# Patient Record
Sex: FEMALE | Race: WHITE | NOT HISPANIC OR LATINO | Employment: OTHER
[De-identification: names, ages, dates, MRNs, and addresses within clinical notes are randomized per-mention and may not be internally consistent; named-entity substitution may affect disease eponyms.]

---

## 2017-02-09 ENCOUNTER — HOSPITAL (OUTPATIENT)
Dept: OTHER | Age: 72
End: 2017-02-09
Attending: INTERNAL MEDICINE

## 2018-03-12 ENCOUNTER — HOSPITAL (OUTPATIENT)
Dept: OTHER | Age: 73
End: 2018-03-12
Attending: INTERNAL MEDICINE

## 2018-03-22 ENCOUNTER — HOSPITAL (OUTPATIENT)
Dept: OTHER | Age: 73
End: 2018-03-22
Attending: INTERNAL MEDICINE

## 2018-04-01 ENCOUNTER — HOSPITAL (OUTPATIENT)
Dept: OTHER | Age: 73
End: 2018-04-01
Attending: FAMILY MEDICINE

## 2018-04-07 ENCOUNTER — HOSPITAL (OUTPATIENT)
Dept: OTHER | Age: 73
End: 2018-04-07
Attending: INTERNAL MEDICINE

## 2018-04-19 PROBLEM — 442076002 EARLY SATIETY: Status: ACTIVE | Noted: 2018-04-19

## 2018-04-19 PROBLEM — 29857009 CHEST PAIN: Status: ACTIVE | Noted: 2018-04-19

## 2018-05-23 PROBLEM — 28132005 SPASM OF SPHINCTER OF ODDI: Status: ACTIVE | Noted: 2018-05-23

## 2018-05-23 PROBLEM — 274527008 ABNORMAL FINDINGS DIAGNOSTIC IMAGING OF LIVER AND BILIARY TRACT: Status: ACTIVE | Noted: 2018-04-19

## 2018-08-31 ENCOUNTER — HOSPITAL (OUTPATIENT)
Dept: OTHER | Age: 73
End: 2018-08-31
Attending: INTERNAL MEDICINE

## 2018-09-06 ENCOUNTER — HOSPITAL (OUTPATIENT)
Dept: OTHER | Age: 73
End: 2018-09-06
Attending: INTERNAL MEDICINE

## 2018-09-17 ENCOUNTER — HOSPITAL (OUTPATIENT)
Dept: OTHER | Age: 73
End: 2018-09-17
Attending: INTERNAL MEDICINE

## 2018-11-15 ENCOUNTER — HOSPITAL (OUTPATIENT)
Dept: OTHER | Age: 73
End: 2018-11-15
Attending: INTERNAL MEDICINE

## 2018-11-15 ENCOUNTER — IMAGING SERVICES (OUTPATIENT)
Dept: OTHER | Age: 73
End: 2018-11-15

## 2019-10-24 PROBLEM — 79922009 EPIGASTRIC PAIN: Status: ACTIVE | Noted: 2018-04-19

## 2019-10-24 PROBLEM — 267103008: Status: ACTIVE | Noted: 2019-10-24

## 2020-03-03 ENCOUNTER — APPOINTMENT (OUTPATIENT)
Dept: MAMMOGRAPHY | Age: 75
End: 2020-03-03
Attending: INTERNAL MEDICINE

## 2020-03-20 ENCOUNTER — APPOINTMENT (OUTPATIENT)
Dept: MAMMOGRAPHY | Age: 75
End: 2020-03-20
Attending: INTERNAL MEDICINE

## 2020-06-06 ENCOUNTER — TELEPHONE ENCOUNTER (OUTPATIENT)
Dept: URBAN - METROPOLITAN AREA CLINIC 35 | Facility: CLINIC | Age: 75
End: 2020-06-06

## 2020-06-10 ENCOUNTER — OFFICE VISIT (OUTPATIENT)
Dept: URBAN - METROPOLITAN AREA CLINIC 35 | Facility: CLINIC | Age: 75
End: 2020-06-10

## 2020-06-10 ENCOUNTER — TELEPHONE ENCOUNTER (OUTPATIENT)
Dept: URBAN - METROPOLITAN AREA CLINIC 35 | Facility: CLINIC | Age: 75
End: 2020-06-10

## 2020-06-10 RX ORDER — ZOLPIDEM TARTRATE 5 MG/1
1 TABLET AT BEDTIME TABLET ORAL ONCE A DAY
Status: ACTIVE | COMMUNITY

## 2020-06-10 RX ORDER — METOCLOPRAMIDE 5 MG/1
TABLET ORAL
Qty: 60 UNSPECIFIED | Status: ON HOLD | COMMUNITY

## 2020-06-10 RX ORDER — FAMOTIDINE 20 MG/1
TABLET ORAL
Qty: 60 UNSPECIFIED | Status: ON HOLD | COMMUNITY

## 2020-06-10 RX ORDER — MAGNESIUM OXIDE/MAG AA CHELATE 300 MG
1 CAPSULE WITH A MEAL CAPSULE ORAL ONCE A DAY
Status: ACTIVE | COMMUNITY

## 2020-06-10 RX ORDER — FAMOTIDINE 20 MG/1
1 TABLET AT BEDTIME AS NEEDED TABLET, FILM COATED ORAL ONCE A DAY
Status: ACTIVE | COMMUNITY
Start: 2019-10-24

## 2020-06-10 RX ORDER — B-COMPLEX WITH VITAMIN C
TABLET ORAL
Status: ACTIVE | COMMUNITY

## 2020-06-10 NOTE — HPI-MIGRATED HPI
;   ;   ;   ;   ;   ;     Globus : Patient admits/denies epsiodes since last visit.  ?Continue Famotidine Tablet, 20 MG, 1 tablet at bedtime as needed, Orally, Once a day   ?Consult with Dr Robin   Last visit (01/23/2020) Patient denies a consult with Dr Robin. She admits occasional globus sensation . She has a post - nasal drip        Last visit (10/24/2019) Admit globus sensation.;   Early Satiety : Patient admits/denies episodes since last visit.  ? Dietary modifications - Gastric emptying study discussed.           Last visit (01/23/2020) Patient denies experiencing early satiety at this time.   Last visit (10/24/2019) She denies that she experience early satiety. She admits that she eats smaller meals and she chews slower.   Last visit (7/18/2019) Patient denies recent episodes of early satiety.  Last visit (3/27/2019)  Patient denies recent episodes of early satiety.  Last visit (5/23/2018) Patient denies any recent episodes of early satiety since her last office visit. She attributes improvement to eating smaller meals, completely chewing food prior to swallowing and following the low FODMAP diet.   At last visit (4/19/2018) Admit early satiety with onset 5 years ago. Eating smaller meals help, but she report still feeling hungry and she will eat more. ;   Chest pain : Patient admits/denies epsiodes since last visit.  Last visit (01/23/2020) She denies any chest pain at this time.   Last visit (10/24/2019) She denies any chest pains at this time.   Last visit (7/18/2019) Patient denies recent episodes of chest pain.   Last visit (3/27/2019) Patient denies recent episodes of chest pain.  Last visit (5/23/2018) Patient denies any recent episodes of chest pain.   At last visit (4/19/2018) Admit a pinching sensation in mid chest that occured three days ago. Lasting for a few seconds.;   Belching : Patient adits/denies epsiodes since last visit.  Last visit (01/23/2020) She admits occasional belching. She reports that belching has decreased since her last office visit.    Last visit (10/24/2019) She admits belching episodes have resolved at this time.   Last visit (7/18/2019) Patient admits improvement of eructation. Patient admits following the low FODMAP diet     Last visit (3/27/2019)  Patient marks improvement of eructation.   Last visit (5/23/2018) Patient admits improvement of symptoms since starting the low FODMAP diet.  At last visit (4/19/2018) Admit frequent belching over the past 3-4 months.  Symptoms occur throughout the day.  Eating smaller meals help. She has been taking Metoclopramide 5 mg prn, and Famotidine 20 mg as needed since her EGD a little over 3 years ago  She report having an EGD a little over 3 years ago ?(2014) performed by Dr. Jeanette Parekh with findings of GERD and H. pylori. Patient report she was treated with Metoclopramide 5 mg prn, and Famotidine 20 mg as needed. ;   Change in Bowel habits : Patient admits/ denies episodes of change of bowel habits since last office visit.   Patient currently admits __ bowel movements per __.  Stools are ___.  Patient ___ blood, mucus, or melena in stools.   Last visit (01/23/2020) Patient admits that her bowel habits are back to normal. She reports that she will have 3 bowel movements a day with no strain. She denies any rectal bleeding or rectal pain, or pruritus ani at this time.   Last visit (10/24/2019) Patient admits that her bowel habits have returned to normal. She admits 1-2 bowel movments a day with stools normal in form.    She denies following the Low FODMAP diet at this time. She admits that she did try the diet and she is now feeling better.   Get colonoscopy records from Dr BRYAN Garcia [facility].        Last visit (7/18/2019) Patient admits increase in bowel movements for the past 3 weeks. Patient admits to 3-4 bowel movements a day, with formed stool. Patient previous movements were once a day. ;   Epigastric Pain : Patient presents today for a follow up of epigastric pain.  Patient admits/denies epsodes since last visit.  Patient continue Famotidine 20 MG, 1 QHS  ?Consult with Dr Robin   Last visit (01/23/2020) Patient presents today for a follow up of epigastric pain.  Patient admits occasional epigastric pain only when she eats spicy food.   Maddison admits that she didn't know that she was supposed to take this medication every night Famotidine 20 MG, 1 QHS. She states that she was taking it only once a week.   Patient denies a consult with Dr Robin.         Last visit (10/24/2019) Patient presents today for a follow up of epigastric pain. Patient admits that she has cut back on rice and french fries and she admits that her epigastric pain has subsided.   She has cut out dairy   Patient admits continued use of Ranitidine 150 mg prn.    Last visit (7/18/2019) Patient presents today for a follow up of epigastric pain, and to review ultrasound. Patient admits improvement of symptoms. Patient admits taking Ranitidine as needed.  Last visit (3/27/2019) She admit improvement of symptoms since her last office visit. She attributes this to following the low FODMAP diet and discontinuing spicy foods and Metoclopramide as she was advised to do at her previous office visit. Patient denies taking Ranitidine.  Last visit (5/23/2018) Patient presents today for a follow up of epigastric pain and to review recent MRI/MRCP results. Patient was also advised to complete a urea breath test at her last appointment, however results were Indeterminate.          Patient states that her symptoms have improved. She admits to having 1-2 episodes per week. She describes episodes as a discomfort occurring in her epigastric area. She does admit symptoms are worse when she is driving. She denies any aggravating factors.         Since her last visit she has discontinued Metoclopramide 5 mg prn, and Famotidine 20 mg as she was advised to do at her last office visit on 04/19/2018. In addition, patient admits to following the low FODMAP since her last visit.          At last visit (4/19/2018) Patient presents today at the request of Dr. Holm for a consultation of epigastric pain. Onset 5 years ago. Report 2-3 episodes occurred 3-4 months ago without provocation. Described as a pain, unsure how to describe. She has been taking Metoclopramide 5 mg prn, and Famotidine 20 mg as needed since her EGD a little over 3 years ago with relief of digestion.           Outside Labs:PCP (3/23/2018) CMP: (WNL) ALT 19, AST 22, Alk. Phosphatase 50, Albumin 4.6. CBC: Hgb 14.7, HCT 44.3. ;

## 2020-06-30 ENCOUNTER — TELEPHONE ENCOUNTER (OUTPATIENT)
Dept: URBAN - METROPOLITAN AREA CLINIC 35 | Facility: CLINIC | Age: 75
End: 2020-06-30

## 2020-07-01 ENCOUNTER — OFFICE VISIT (OUTPATIENT)
Dept: URBAN - METROPOLITAN AREA CLINIC 35 | Facility: CLINIC | Age: 75
End: 2020-07-01

## 2020-07-01 VITALS
DIASTOLIC BLOOD PRESSURE: 58 MMHG | HEIGHT: 60 IN | WEIGHT: 104 LBS | OXYGEN SATURATION: 97 % | HEART RATE: 74 BPM | SYSTOLIC BLOOD PRESSURE: 110 MMHG | TEMPERATURE: 98.7 F | BODY MASS INDEX: 20.42 KG/M2

## 2020-07-01 PROBLEM — 197433003: Status: ACTIVE | Noted: 2018-07-11

## 2020-07-01 RX ORDER — METOCLOPRAMIDE 5 MG/1
TABLET ORAL
Qty: 60 UNSPECIFIED | Status: ON HOLD | COMMUNITY

## 2020-07-01 RX ORDER — ZOLPIDEM TARTRATE 5 MG/1
1 TABLET AT BEDTIME TABLET ORAL ONCE A DAY
Status: ACTIVE | COMMUNITY

## 2020-07-01 RX ORDER — MAGNESIUM OXIDE/MAG AA CHELATE 300 MG
1 CAPSULE WITH A MEAL CAPSULE ORAL ONCE A DAY
Status: ACTIVE | COMMUNITY

## 2020-07-01 RX ORDER — B-COMPLEX WITH VITAMIN C
TABLET ORAL
Status: ACTIVE | COMMUNITY

## 2020-07-01 RX ORDER — FAMOTIDINE 20 MG/1
TABLET ORAL
Qty: 60 UNSPECIFIED | Status: ON HOLD | COMMUNITY

## 2020-07-01 RX ORDER — FAMOTIDINE 20 MG/1
1 TABLET AT BEDTIME AS NEEDED TABLET, FILM COATED ORAL ONCE A DAY
Status: ACTIVE | COMMUNITY
Start: 2019-10-24

## 2020-07-01 NOTE — HPI-MIGRATED HPI
;   ;   ;   ;   ;   ;     Globus : Patient denies epsiodes since last visit.  Patient admits the continued Famotidine Tablet, 20 MG, at bedtime.   Last visit (01/23/2020) Patient denies a consult with Dr Robin. She admits occasional globus sensation . She has a post - nasal drip        Last visit (10/24/2019) Admit globus sensation.;   Early Satiety : Patient admits episodes since last visit.  She admits that she has made Dietary modifications as well as eat slower and chewing more has helped.   Last visit (01/23/2020) Patient denies experiencing early satiety at this time.   Last visit (10/24/2019) She denies that she experience early satiety. She admits that she eats smaller meals and she chews slower.   Last visit (7/18/2019) Patient denies recent episodes of early satiety.  Last visit (3/27/2019)  Patient denies recent episodes of early satiety.  Last visit (5/23/2018) Patient denies any recent episodes of early satiety since her last office visit. She attributes improvement to eating smaller meals, completely chewing food prior to swallowing and following the low FODMAP diet.   At last visit (4/19/2018) Admit early satiety with onset 5 years ago. Eating smaller meals help, but she report still feeling hungry and she will eat more. ;   Chest pain : Patient admits [facility] epsiodes 1-2 times a month since last visit.  Last visit (01/23/2020) She denies any chest pain at this time.   Last visit (10/24/2019) She denies any chest pains at this time.   Last visit (7/18/2019) Patient denies recent episodes of chest pain.   Last visit (3/27/2019) Patient denies recent episodes of chest pain.  Last visit (5/23/2018) Patient denies any recent episodes of chest pain.   At last visit (4/19/2018) Admit a pinching sensation in mid chest that occured three days ago. Lasting for a few seconds.;   Belching : Patient admits occasional belching but reports that symptoms have decreased.   Last visit (01/23/2020) She admits occasional belching. She reports that belching has decreased since her last office visit.    Last visit (10/24/2019) She admits belching episodes have resolved at this time.   Last visit (7/18/2019) Patient admits improvement of eructation. Patient admits following the low FODMAP diet     Last visit (3/27/2019)  Patient marks improvement of eructation.   Last visit (5/23/2018) Patient admits improvement of symptoms since starting the low FODMAP diet.  At last visit (4/19/2018) Admit frequent belching over the past 3-4 months.  Symptoms occur throughout the day.  Eating smaller meals help. She has been taking Metoclopramide 5 mg prn, and Famotidine 20 mg as needed since her EGD a little over 3 years ago  She report having an EGD a little over 3 years ago ?(2014) performed by Dr. Jeanette Parekh with findings of GERD and H. pylori. Patient report she was treated with Metoclopramide 5 mg prn, and Famotidine 20 mg as needed. ;   Change in Bowel habits : Patient denies that her bowel habits have returned to normal.   Patient currently admits 2-3 bowel movements per day with no strain.  Stools are soft.  Patient denies blood, mucus, or melena in stools.   Last visit (01/23/2020) Patient admits that her bowel habits are back to normal. She reports that she will have 3 bowel movements a day with no strain. She denies any rectal bleeding or rectal pain, or pruritus ani at this time.   Last visit (10/24/2019) Patient admits that her bowel habits have returned to normal. She admits 1-2 bowel movments a day with stools normal in form.    She denies following the Low FODMAP diet at this time. She admits that she did try the diet and she is now feeling better.   Get colonoscopy records from Dr BRYAN Parekh - GI.        Last visit (7/18/2019) Patient admits increase in bowel movements for the past 3 weeks. Patient admits to 3-4 bowel movements a day, with formed stool. Patient previous movements were once a day. ;   Epigastric Pain : Patient presents today for a follow up of epigastric pain.  Patient admits continued epsodes of epigastric pain since last visit. She admits the pain is not as frequent. She will experience this pain 1-2 times a month.   She admits the use of Famotidine 20 MG at bedtime with mild relief of her symptoms. She denies that this controls her symptoms. She admits that she began using a homemade remedy that has helped reduce her symptoms.   She has food intolerance with sea food causing the epigastric pain   She admits that she has seen Dr Robin and her next visit with him will be in Oct/Nov.    Last visit (01/23/2020) Patient presents today for a follow up of epigastric pain.  Patient admits occasional epigastric pain only when she eats spicy food.   Mariet admits that she didn't know that she was supposed to take this medication every night Famotidine 20 MG, 1 QHS. She states that she was taking it only once a week.   Patient denies a consult with Dr Robin.         Last visit (10/24/2019) Patient presents today for a follow up of epigastric pain. Patient admits that she has cut back on rice and french fries and she admits that her epigastric pain has subsided.   She has cut out dairy   Patient admits continued use of Ranitidine 150 mg prn.    Last visit (7/18/2019) Patient presents today for a follow up of epigastric pain, and to review ultrasound. Patient admits improvement of symptoms. Patient admits taking Ranitidine as needed.  Last visit (3/27/2019) She admit improvement of symptoms since her last office visit. She attributes this to following the low FODMAP diet and discontinuing spicy foods and Metoclopramide as she was advised to do at her previous office visit. Patient denies taking Ranitidine.  Last visit (5/23/2018) Patient presents today for a follow up of epigastric pain and to review recent MRI/MRCP results. Patient was also advised to complete a urea breath test at her last appointment, however results were Indeterminate.          Patient states that her symptoms have improved. She admits to having 1-2 episodes per week. She describes episodes as a discomfort occurring in her epigastric area. She does admit symptoms are worse when she is driving. She denies any aggravating factors.         Since her last visit she has discontinued Metoclopramide 5 mg prn, and Famotidine 20 mg as she was advised to do at her last office visit on 04/19/2018. In addition, patient admits to following the low FODMAP since her last visit.          At last visit (4/19/2018) Patient presents today at the request of Dr. Holm for a consultation of epigastric pain. Onset 5 years ago. Report 2-3 episodes occurred 3-4 months ago without provocation. Described as a pain, unsure how to describe. She has been taking Metoclopramide 5 mg prn, and Famotidine 20 mg as needed since her EGD a little over 3 years ago with relief of digestion.           Outside Labs:PCP (3/23/2018) CMP: (WNL) ALT 19, AST 22, Alk. Phosphatase 50, Albumin 4.6. CBC: Hgb 14.7, HCT 44.3. ;

## 2020-07-04 ENCOUNTER — LAB OUTSIDE AN ENCOUNTER (OUTPATIENT)
Dept: URBAN - METROPOLITAN AREA CLINIC 35 | Facility: CLINIC | Age: 75
End: 2020-07-04

## 2020-07-08 LAB
GASTROINTESTINAL PATHOGEN: (no result)
H. PYLORI (EIA) - QDX: NEGATIVE

## 2020-07-13 LAB
IMMUNOGLOBULIN A: 177
INTERPRETATION: (no result)
TISSUE TRANSGLUTAMINASE$AB, IGA: 1

## 2020-07-30 ENCOUNTER — OFFICE VISIT (OUTPATIENT)
Dept: URBAN - METROPOLITAN AREA CLINIC 33 | Facility: CLINIC | Age: 75
End: 2020-07-30

## 2020-07-30 VITALS — BODY MASS INDEX: 21.6 KG/M2 | HEIGHT: 60 IN | TEMPERATURE: 97.1 F | WEIGHT: 110 LBS

## 2020-07-30 RX ORDER — FAMOTIDINE 20 MG/1
TABLET ORAL
Qty: 60 UNSPECIFIED | Status: ON HOLD | COMMUNITY

## 2020-07-30 RX ORDER — MAGNESIUM OXIDE/MAG AA CHELATE 300 MG
1 CAPSULE WITH A MEAL CAPSULE ORAL ONCE A DAY
Status: ACTIVE | COMMUNITY

## 2020-07-30 RX ORDER — METOCLOPRAMIDE 5 MG/1
TABLET ORAL
Qty: 60 UNSPECIFIED | Status: ON HOLD | COMMUNITY

## 2020-07-30 RX ORDER — ZOLPIDEM TARTRATE 5 MG/1
1 TABLET AT BEDTIME TABLET ORAL ONCE A DAY
Status: ACTIVE | COMMUNITY

## 2020-07-30 RX ORDER — FAMOTIDINE 20 MG/1
1 TABLET AT BEDTIME AS NEEDED TABLET, FILM COATED ORAL ONCE A DAY
Status: ACTIVE | COMMUNITY
Start: 2019-10-24

## 2020-07-30 RX ORDER — B-COMPLEX WITH VITAMIN C
TABLET ORAL
Status: ACTIVE | COMMUNITY

## 2020-07-30 RX ORDER — SODIUM PICOSULFATE, MAGNESIUM OXIDE, AND ANHYDROUS CITRIC ACID 10; 3.5; 12 MG/160ML; G/160ML; G/160ML
AS DIRECTED LIQUID ORAL
Qty: 1 KIT | OUTPATIENT
Start: 2020-07-30

## 2020-07-30 RX ORDER — IPRATROPIUM BROMIDE 21 UG/1
SPRAY, METERED NASAL
Qty: 30 UNSPECIFIED | Status: ACTIVE | COMMUNITY

## 2020-07-30 NOTE — HPI-MIGRATED HPI
;   ;   ;   ;   ;   ;     Globus : Admit always feel as if mucus is in her esophagus.  She saw ENT  Dr Robin and was treated with Ipratropium bromide NS with relief in the past, but has not been able to get from [facility] pharmacy due to not available.   Last visit (7/1/2020)Patient denies episodes since last visit.  Patient admits the continued Famotidine Tablet, 20 MG, at bedtime.   Last visit (01/23/2020) Patient denies a consult with Dr Robin. She admits occasional globus sensation . She has a post - nasal drip        Last visit (10/24/2019) Admit globus sensation.;   Early Satiety : Admit some relief with consuming smaller portion meals.    Last visit (7/1/2020) Patient admits episodes since last visit.  She admits that she has made Dietary modifications as well as eat slower and chewing more has helped.   Last visit (01/23/2020) Patient denies experiencing early satiety at this time.   Last visit (10/24/2019) She denies that she experience early satiety. She admits that she eats smaller meals and she chews slower.   Last visit (7/18/2019) Patient denies recent episodes of early satiety.  Last visit (3/27/2019)  Patient denies recent episodes of early satiety.  Last visit (5/23/2018) Patient denies any recent episodes of early satiety since her last office visit. She attributes improvement to eating smaller meals, completely chewing food prior to swallowing and following the low FODMAP diet.   At last visit (4/19/2018) Admit early satiety with onset 5 years ago. Eating smaller meals help, but she report still feeling hungry and she will eat more. ;   Chest pain : Admit improvement in symptoms wince last visit.     Last visit (7/1/2020) Patient admits NCCP episodes 1-2 times a month since last visit.  Last visit (01/23/2020) She denies any chest pain at this time.   Last visit (10/24/2019) She denies any chest pains at this time.   Last visit (7/18/2019) Patient denies recent episodes of chest pain.   Last visit (3/27/2019) Patient denies recent episodes of chest pain.  Last visit (5/23/2018) Patient denies any recent episodes of chest pain.   At last visit (4/19/2018) Admit a pinching sensation in mid chest that occurred three days ago. Lasting for a few seconds.;   Belching : Admit imoirvement since last visit,   Last visit (7/1/2020)Patient admits occasional belching but reports that symptoms have decreased.   Last visit (01/23/2020) She admits occasional belching. She reports that belching has decreased since her last office visit.    Last visit (10/24/2019) She admits belching episodes have resolved at this time.   Last visit (7/18/2019) Patient admits improvement of eructation. Patient admits following the low FODMAP diet     Last visit (3/27/2019)  Patient marks improvement of eructation.   Last visit (5/23/2018) Patient admits improvement of symptoms since starting the low FODMAP diet.  At last visit (4/19/2018) Admit frequent belching over the past 3-4 months.  Symptoms occur throughout the day.  Eating smaller meals help. She has been taking Metoclopramide 5 mg prn, and Famotidine 20 mg as needed since her EGD a little over 3 years ago  She report having an EGD a little over 3 years ago ?(2014) performed by Dr. Jeanette Parekh with findings of GERD and H. pylori. Patient report she was treated with Metoclopramide 5 mg prn, and Famotidine 20 mg as needed. ;   Change in Bowel habits : Patient presents today to review labs, QDx stool study and follow up of change in bowel habits.  Currently admit 2-3 bowel movements per day with no strain.  Stools are soft peices.  Patient denies blood, mucus, or melena in stools.   She had been adhering Low FODMAP diet   Last visit (7/1/2020) Patient denies that her bowel habits have returned to normal.   Patient currently admits 2-3 bowel movements per day with no strain.  Stools are soft.  Patient denies blood, mucus, or melena in stools.   Last visit (01/23/2020) Patient admits that her bowel habits are back to normal. She reports that she will have 3 bowel movements a day with no strain. She denies any rectal bleeding or rectal pain, or pruritus ani at this time.   Last visit (10/24/2019) Patient admits that her bowel habits have returned to normal. She admits 1-2 bowel movements a day with stools normal in form.    She denies following the Low FODMAP diet at this time. She admits that she did try the diet and she is now feeling better.   Get colonoscopy records from Dr BRYAN MACIEL.        Last visit (7/18/2019) Patient admits increase in bowel movements for the past 3 weeks. Patient admits to 3-4 bowel movements a day, with formed stool. Patient previous movements were once a day. ;   Epigastric Pain : Admit improvement with adhering to low FODMAP diet and avoiding apples, plum, water melon, cherries, broccoli or onions.     Last visit (7/1/2020) Patient presents today for a follow up of epigastric pain.  Patient admits continued episodes of epigastric pain since last visit. She admits the pain is not as frequent. She will experience this pain 1-2 times a month.   She admits the use of Famotidine 20 MG at bedtime with mild relief of her symptoms. She denies that this controls her symptoms. She admits that she began using a homemade remedy that has helped reduce her symptoms.   She has food intolerance with sea food causing the epigastric pain   She admits that she has seen Dr Robin and her next visit with him will be in Oct/Nov.    Last visit (01/23/2020) Patient presents today for a follow up of epigastric pain.  Patient admits occasional epigastric pain only when she eats spicy food.   Patient admits that she didn't know that she was supposed to take this medication every night Famotidine 20 MG, 1 QHS. She states that she was taking it only once a week.   Patient denies a consult with Dr Robin.         Last visit (10/24/2019) Patient presents today for a follow up of epigastric pain. Patient admits that she has cut back on rice and french fries and she admits that her epigastric pain has subsided.   She has cut out dairy   Patient admits continued use of Ranitidine 150 mg prn.    Last visit (7/18/2019) Patient presents today for a follow up of epigastric pain, and to review ultrasound. Patient admits improvement of symptoms. Patient admits taking Ranitidine as needed.  Last visit (3/27/2019) She admit improvement of symptoms since her last office visit. She attributes this to following the low FODMAP diet and discontinuing spicy foods and Metoclopramide as she was advised to do at her previous office visit. Patient denies taking Ranitidine.  Last visit (5/23/2018) Patient presents today for a follow up of epigastric pain and to review recent MRI/MRCP results. Patient was also advised to complete a urea breath test at her last appointment, however results were Indeterminate.          Patient states that her symptoms have improved. She admits to having 1-2 episodes per week. She describes episodes as a discomfort occurring in her epigastric area. She does admit symptoms are worse when she is driving. She denies any aggravating factors.         Since her last visit she has discontinued Metoclopramide 5 mg prn, and Famotidine 20 mg as she was advised to do at her last office visit on 04/19/2018. In addition, patient admits to following the low FODMAP since her last visit.          At last visit (4/19/2018) Patient presents today at the request of Dr. Holm for a consultation of epigastric pain. Onset 5 years ago. Report 2-3 episodes occurred 3-4 months ago without provocation. Described as a pain, unsure how to describe. She has been taking Metoclopramide 5 mg prn, and Famotidine 20 mg as needed since her EGD a little over 3 years ago with relief of digestion.           Outside Labs:PCP (3/23/2018) CMP: (WNL) ALT 19, AST 22, Alk. Phosphatase 50, Albumin 4.6. CBC: Hgb 14.7, HCT 44.3. ;

## 2020-09-30 ENCOUNTER — TELEPHONE ENCOUNTER (OUTPATIENT)
Dept: URBAN - METROPOLITAN AREA CLINIC 35 | Facility: CLINIC | Age: 75
End: 2020-09-30

## 2020-09-30 RX ORDER — SODIUM PICOSULFATE, MAGNESIUM OXIDE, AND ANHYDROUS CITRIC ACID 10; 3.5; 12 MG/160ML; G/160ML; G/160ML
AS DIRECTED LIQUID ORAL
Qty: 1 KIT | OUTPATIENT
Start: 2020-07-30

## 2020-10-01 ENCOUNTER — OFFICE VISIT (OUTPATIENT)
Dept: URBAN - METROPOLITAN AREA CLINIC 35 | Facility: CLINIC | Age: 75
End: 2020-10-01

## 2020-10-01 ENCOUNTER — TELEPHONE ENCOUNTER (OUTPATIENT)
Dept: URBAN - METROPOLITAN AREA CLINIC 35 | Facility: CLINIC | Age: 75
End: 2020-10-01

## 2020-10-02 ENCOUNTER — OFFICE VISIT (OUTPATIENT)
Dept: URBAN - METROPOLITAN AREA CLINIC 35 | Facility: CLINIC | Age: 75
End: 2020-10-02

## 2020-10-05 ENCOUNTER — TELEPHONE ENCOUNTER (OUTPATIENT)
Dept: URBAN - METROPOLITAN AREA CLINIC 35 | Facility: CLINIC | Age: 75
End: 2020-10-05

## 2020-10-05 RX ORDER — SODIUM PICOSULFATE, MAGNESIUM OXIDE, AND ANHYDROUS CITRIC ACID 10; 3.5; 12 MG/160ML; G/160ML; G/160ML
AS DIRECTED LIQUID ORAL
Qty: 1 KIT | OUTPATIENT
Start: 2020-07-30

## 2020-10-07 ENCOUNTER — OFFICE VISIT (OUTPATIENT)
Dept: URBAN - METROPOLITAN AREA SURGERY CENTER 8 | Facility: SURGERY CENTER | Age: 75
End: 2020-10-07

## 2020-10-22 ENCOUNTER — OFFICE VISIT (OUTPATIENT)
Dept: URBAN - METROPOLITAN AREA CLINIC 33 | Facility: CLINIC | Age: 75
End: 2020-10-22

## 2021-03-01 DIAGNOSIS — Z12.31 VISIT FOR SCREENING MAMMOGRAM: Primary | ICD-10-CM

## 2021-03-23 ENCOUNTER — LAB OUTSIDE AN ENCOUNTER (OUTPATIENT)
Dept: URBAN - METROPOLITAN AREA SURGERY CENTER 8 | Facility: SURGERY CENTER | Age: 76
End: 2021-03-23

## 2021-03-23 ENCOUNTER — OFFICE VISIT (OUTPATIENT)
Dept: URBAN - METROPOLITAN AREA CLINIC 35 | Facility: CLINIC | Age: 76
End: 2021-03-23

## 2021-03-23 ENCOUNTER — TELEPHONE ENCOUNTER (OUTPATIENT)
Dept: URBAN - METROPOLITAN AREA CLINIC 35 | Facility: CLINIC | Age: 76
End: 2021-03-23

## 2021-03-23 VITALS — TEMPERATURE: 96.1 F | BODY MASS INDEX: 21.6 KG/M2 | HEIGHT: 60 IN | WEIGHT: 110 LBS

## 2021-03-23 RX ORDER — UBIDECARENONE/VIT E ACET 100MG-5
3 CAPSULES CAPSULE ORAL ONCE A DAY
Status: ACTIVE | COMMUNITY

## 2021-03-23 RX ORDER — FAMOTIDINE 20 MG/1
1 TABLET AT BEDTIME AS NEEDED TABLET, FILM COATED ORAL ONCE A DAY
Status: ACTIVE | COMMUNITY
Start: 2019-10-24

## 2021-03-23 RX ORDER — MAGNESIUM OXIDE/MAG AA CHELATE 300 MG
1 CAPSULE WITH A MEAL CAPSULE ORAL ONCE A DAY
Status: ACTIVE | COMMUNITY

## 2021-03-23 RX ORDER — ZOLPIDEM TARTRATE 5 MG/1
1 TABLET AT BEDTIME TABLET ORAL ONCE A DAY
Status: ACTIVE | COMMUNITY

## 2021-03-23 RX ORDER — IPRATROPIUM BROMIDE 21 UG/1
SPRAY, METERED NASAL
Qty: 30 UNSPECIFIED | Status: ACTIVE | COMMUNITY

## 2021-03-23 RX ORDER — FAMOTIDINE 20 MG/1
TABLET ORAL
Qty: 60 UNSPECIFIED | Status: ON HOLD | COMMUNITY

## 2021-03-23 RX ORDER — FAMOTIDINE 20 MG/1
1 TABLET AT BEDTIME AS NEEDED TABLET, FILM COATED ORAL ONCE A DAY
Qty: 90 TABLET | Refills: 1 | OUTPATIENT
Start: 2019-10-24

## 2021-03-23 RX ORDER — B-COMPLEX WITH VITAMIN C
TABLET ORAL
Status: DISCONTINUED | COMMUNITY

## 2021-03-23 RX ORDER — POLYETHYLENE GLYCOL 3350, SODIUM SULFATE, SODIUM CHLORIDE, POTASSIUM CHLORIDE, ASCORBIC ACID, SODIUM ASCORBATE 140-9-5.2G
500 ML KIT ORAL
Qty: 1 KIT | Refills: 0 | OUTPATIENT
Start: 2021-03-23

## 2021-03-23 RX ORDER — SODIUM PICOSULFATE, MAGNESIUM OXIDE, AND ANHYDROUS CITRIC ACID 10; 3.5; 12 MG/160ML; G/160ML; G/160ML
AS DIRECTED LIQUID ORAL
Qty: 1 KIT | Status: DISCONTINUED | COMMUNITY
Start: 2020-07-30

## 2021-03-23 RX ORDER — METOCLOPRAMIDE 5 MG/1
TABLET ORAL
Qty: 60 UNSPECIFIED | Status: ON HOLD | COMMUNITY

## 2021-03-23 NOTE — HPI-MIGRATED HPI
;   ;   ;   ;   ;   ;     Globus : Admits episodes of globus sensation that is more noticeable when laying down at night.  She will occasionally use Ipratropium bromide with some relief.  since her last office visit.    Last office visit (07/30/2020) Admit always feel as if mucus is in her esophagus.  She saw ENT  Dr Robin and was treated with Ipratropium bromide NS with relief in the past, but has not been able to get from [facility] pharmacy due to not available.   Last visit (7/1/2020) Patient denies episodes since last visit.  Patient admits the continued Famotidine Tablet, 20 MG, at bedtime.   Last visit (01/23/2020) Patient denies a consult with Dr Robin. She admits occasional globus sensation . She has a post - nasal drip        Last visit (10/24/2019) Admit globus sensation.;   Early Satiety : Admits continued epidoses of early satiety.    Last office visit (07/30/2020) Admit some relief with consuming smaller portion meals.  Last visit (7/1/2020)  Patient admits episodes since last visit.  She admits that she has made Dietary modifications as well as eat slower and chewing more has helped.   Last visit (01/23/2020) Patient denies experiencing early satiety at this time.   Last visit (10/24/2019) She denies that she experience early satiety. She admits that she eats smaller meals and she chews slower.   Last visit (7/18/2019) Patient denies recent episodes of early satiety.  Last visit (3/27/2019)  Patient denies recent episodes of early satiety.  Last visit (5/23/2018) Patient denies any recent episodes of early satiety since her last office visit. She attributes improvement to eating smaller meals, completely chewing food prior to swallowing and following the low FODMAP diet.   At last visit (4/19/2018) Admit early satiety with onset 5 years ago. Eating smaller meals help, but she report still feeling hungry and she will eat more.;   Chest pain : Denies any episodes of chest pain since her last office visit.     Last office visit (07/30/2020) Admit improvement in symptoms wince last visit.   Last visit (7/1/2020)  Patient admits NCCP episodes 1-2 times a month since last visit.  Last visit (01/23/2020) She denies any chest pain at this time.   Last visit (10/24/2019) She denies any chest pains at this time.   Last visit (7/18/2019) Patient denies recent episodes of chest pain.   Last visit (3/27/2019) Patient denies recent episodes of chest pain.  Last visit (5/23/2018) Patient denies any recent episodes of chest pain.   At last visit (4/19/2018) Admit a pinching sensation in mid chest that occurred three days ago. Lasting for a few seconds.;   Belching : Admits periodic episodes of belching since her last office visit.    Last office visit (07/30/2020) Admit improvement since last visit,   Last visit (7/1/2020)Patient admits occasional belching but reports that symptoms have decreased.   Last visit (01/23/2020) She admits occasional belching. She reports that belching has decreased since her last office visit.    Last visit (10/24/2019) She admits belching episodes have resolved at this time.   Last visit (7/18/2019) Patient admits improvement of eructation. Patient admits following the low FODMAP diet     Last visit (3/27/2019)  Patient marks improvement of eructation.   Last visit (5/23/2018) Patient admits improvement of symptoms since starting the low FODMAP diet.  At last visit (4/19/2018) Admit frequent belching over the past 3-4 months.  Symptoms occur throughout the day.  Eating smaller meals help. She has been taking Metoclopramide 5 mg prn, and Famotidine 20 mg as needed since her EGD a little over 3 years ago  She report having an EGD a little over 3 years ago ?(2014) performed by Dr. Jeanette Parekh with findings of GERD and H. pylori. Patient report she was treated with Metoclopramide 5 mg prn, and Famotidine 20 mg as needed.;   Change in Bowel habits : 75-Year-old female patient presents today for a follow up in change in bowel habits. She currently reports 2-3 bowel movements per day, without strain. Denies any mucus, melena or blood in her stools. Denies any pruritus ani or rectal pain.    Of note: Patient was advised to have a colonoscopy and endoscopy in her previous visit and was sent at risk letter.   Last office visit (07/30/2020) Patient presents today to review labs, QDx stool study and follow up of change in bowel habits.  Currently admit 2-3 bowel movements per day with no strain.  Stools are soft pieces.  Patient denies blood, mucus, or melena in stools.   She had been adhering Low FODMAP diet   Last visit (7/1/2020) Patient denies that her bowel habits have returned to normal.   Patient currently admits 2-3 bowel movements per day with no strain.  Stools are soft.  Patient denies blood, mucus, or melena in stools.   Last visit (01/23/2020) Patient admits that her bowel habits are back to normal. She reports that she will have 3 bowel movements a day with no strain. She denies any rectal bleeding or rectal pain, or pruritus ani at this time.   Last visit (10/24/2019) Patient admits that her bowel habits have returned to normal. She admits 1-2 bowel movements a day with stools normal in form.    She denies following the Low FODMAP diet at this time. She admits that she did try the diet and she is now feeling better.   Get colonoscopy records from Dr BRYAN MACIEL.        Last visit (7/18/2019) Patient admits increase in bowel movements for the past 3 weeks. Patient admits to 3-4 bowel movements a day, with formed stool. Patient previous movements were once a day.;   Epigastric Pain : Admits episodes of epigastric cramping sensation that is present post prandial since her last office visit.  Admit relief following evacuations.  She continue to adhere to low FODMAP diet and avoiding apples, plum, water melon, cherries, broccoli or onions.   Last office visit (07/30/2020) Admit improvement with adhering to low FODMAP diet and avoiding apples, plum, water melon, cherries, broccoli or onions.   Last visit (7/1/2020) Patient presents today for a follow up of epigastric pain.  Patient admits continued episodes of epigastric pain since last visit. She admits the pain is not as frequent. She will experience this pain 1-2 times a month.   She admits the use of Famotidine 20 MG at bedtime with mild relief of her symptoms. She denies that this controls her symptoms. She admits that she began using a homemade remedy that has helped reduce her symptoms.   She has food intolerance with sea food causing the epigastric pain   She admits that she has seen Dr Robin and her next visit with him will be in Oct/Nov.    Last visit (01/23/2020) Patient presents today for a follow up of epigastric pain.  Patient admits occasional epigastric pain only when she eats spicy food.   Patient admits that she didn't know that she was supposed to take this medication every night Famotidine 20 MG, 1 QHS. She states that she was taking it only once a week.   Patient denies a consult with Dr Robin.         Last visit (10/24/2019) Patient presents today for a follow up of epigastric pain. Patient admits that she has cut back on rice and french fries and she admits that her epigastric pain has subsided.   She has cut out dairy   Patient admits continued use of Ranitidine 150 mg prn.    Last visit (7/18/2019) Patient presents today for a follow up of epigastric pain, and to review ultrasound. Patient admits improvement of symptoms. Patient admits taking Ranitidine as needed.  Last visit (3/27/2019) She admit improvement of symptoms since her last office visit. She attributes this to following the low FODMAP diet and discontinuing spicy foods and Metoclopramide as she was advised to do at her previous office visit. Patient denies taking Ranitidine.  Last visit (5/23/2018) Patient presents today for a follow up of epigastric pain and to review recent MRI/MRCP results. Patient was also advised to complete a urea breath test at her last appointment, however results were Indeterminate.          Patient states that her symptoms have improved. She admits to having 1-2 episodes per week. She describes episodes as a discomfort occurring in her epigastric area. She does admit symptoms are worse when she is driving. She denies any aggravating factors.         Since her last visit she has discontinued Metoclopramide 5 mg prn, and Famotidine 20 mg as she was advised to do at her last office visit on 04/19/2018. In addition, patient admits to following the low FODMAP since her last visit.          At last visit (4/19/2018) Patient presents today at the request of Dr. Holm for a consultation of epigastric pain. Onset 5 years ago. Report 2-3 episodes occurred 3-4 months ago without provocation. Described as a pain, unsure how to describe. She has been taking Metoclopramide 5 mg prn, and Famotidine 20 mg as needed since her EGD a little over 3 years ago with relief of digestion.           Outside Labs:PCP (3/23/2018) CMP: (WNL) ALT 19, AST 22, Alk. Phosphatase 50, Albumin 4.6. CBC: Hgb 14.7, HCT 44.3.;

## 2021-04-13 ENCOUNTER — TELEPHONE ENCOUNTER (OUTPATIENT)
Dept: URBAN - METROPOLITAN AREA CLINIC 35 | Facility: CLINIC | Age: 76
End: 2021-04-13

## 2021-04-13 ENCOUNTER — APPOINTMENT (OUTPATIENT)
Dept: MAMMOGRAPHY | Age: 76
End: 2021-04-13
Attending: INTERNAL MEDICINE

## 2021-04-16 ENCOUNTER — OFFICE VISIT (OUTPATIENT)
Dept: URBAN - METROPOLITAN AREA SURGERY CENTER 8 | Facility: SURGERY CENTER | Age: 76
End: 2021-04-16

## 2021-04-23 ENCOUNTER — TELEPHONE ENCOUNTER (OUTPATIENT)
Dept: URBAN - METROPOLITAN AREA CLINIC 35 | Facility: CLINIC | Age: 76
End: 2021-04-23

## 2021-04-23 ENCOUNTER — OFFICE VISIT (OUTPATIENT)
Dept: URBAN - METROPOLITAN AREA SURGERY CENTER 8 | Facility: SURGERY CENTER | Age: 76
End: 2021-04-23

## 2021-04-23 PROBLEM — 20639004: Status: ACTIVE | Noted: 2021-04-23

## 2021-04-23 RX ORDER — ZOLPIDEM TARTRATE 5 MG/1
1 TABLET AT BEDTIME TABLET ORAL ONCE A DAY
Status: ACTIVE | COMMUNITY

## 2021-04-23 RX ORDER — UBIDECARENONE/VIT E ACET 100MG-5
3 CAPSULES CAPSULE ORAL ONCE A DAY
Status: ACTIVE | COMMUNITY

## 2021-04-23 RX ORDER — METOCLOPRAMIDE 5 MG/1
TABLET ORAL
Qty: 60 UNSPECIFIED | Status: ON HOLD | COMMUNITY

## 2021-04-23 RX ORDER — FLUCONAZOLE 100 MG/1
1 TABLET TABLET ORAL DAILY
Qty: 11 | OUTPATIENT
Start: 2021-04-23

## 2021-04-23 RX ORDER — FAMOTIDINE 20 MG/1
1 TABLET AT BEDTIME AS NEEDED TABLET, FILM COATED ORAL ONCE A DAY
Qty: 90 TABLET | Refills: 1 | Status: ACTIVE | COMMUNITY
Start: 2019-10-24

## 2021-04-23 RX ORDER — FAMOTIDINE 20 MG/1
TABLET ORAL
Qty: 60 UNSPECIFIED | Status: ON HOLD | COMMUNITY

## 2021-04-23 RX ORDER — MAGNESIUM OXIDE/MAG AA CHELATE 300 MG
1 CAPSULE WITH A MEAL CAPSULE ORAL ONCE A DAY
Status: ACTIVE | COMMUNITY

## 2021-04-23 RX ORDER — IPRATROPIUM BROMIDE 21 UG/1
SPRAY, METERED NASAL
Qty: 30 UNSPECIFIED | Status: ACTIVE | COMMUNITY

## 2021-04-23 RX ORDER — POLYETHYLENE GLYCOL 3350, SODIUM SULFATE, SODIUM CHLORIDE, POTASSIUM CHLORIDE, ASCORBIC ACID, SODIUM ASCORBATE 140-9-5.2G
500 ML KIT ORAL
Qty: 1 KIT | Refills: 0 | Status: ACTIVE | COMMUNITY
Start: 2021-03-23

## 2021-04-26 ENCOUNTER — TELEPHONE ENCOUNTER (OUTPATIENT)
Dept: URBAN - METROPOLITAN AREA CLINIC 35 | Facility: CLINIC | Age: 76
End: 2021-04-26

## 2021-04-27 ENCOUNTER — OFFICE VISIT (OUTPATIENT)
Dept: URBAN - METROPOLITAN AREA CLINIC 35 | Facility: CLINIC | Age: 76
End: 2021-04-27

## 2021-04-27 RX ORDER — POLYETHYLENE GLYCOL 3350, SODIUM SULFATE, SODIUM CHLORIDE, POTASSIUM CHLORIDE, ASCORBIC ACID, SODIUM ASCORBATE 140-9-5.2G
500 ML KIT ORAL
Qty: 1 KIT | Refills: 0 | Status: ACTIVE | COMMUNITY
Start: 2021-03-23

## 2021-04-27 RX ORDER — FAMOTIDINE 20 MG/1
1 TABLET AT BEDTIME AS NEEDED TABLET, FILM COATED ORAL ONCE A DAY
Qty: 90 TABLET | Refills: 1 | Status: ACTIVE | COMMUNITY
Start: 2019-10-24

## 2021-04-27 RX ORDER — MAGNESIUM OXIDE/MAG AA CHELATE 300 MG
1 CAPSULE WITH A MEAL CAPSULE ORAL ONCE A DAY
Status: ACTIVE | COMMUNITY

## 2021-04-27 RX ORDER — FLUCONAZOLE 100 MG/1
1 TABLET TABLET ORAL DAILY
Qty: 11 | Status: ACTIVE | COMMUNITY
Start: 2021-04-23

## 2021-04-27 RX ORDER — METOCLOPRAMIDE 5 MG/1
TABLET ORAL
Qty: 60 UNSPECIFIED | Status: ON HOLD | COMMUNITY

## 2021-04-27 RX ORDER — FAMOTIDINE 20 MG/1
TABLET ORAL
Qty: 60 UNSPECIFIED | Status: ON HOLD | COMMUNITY

## 2021-04-27 RX ORDER — UBIDECARENONE/VIT E ACET 100MG-5
3 CAPSULES CAPSULE ORAL ONCE A DAY
Status: ACTIVE | COMMUNITY

## 2021-04-27 RX ORDER — IPRATROPIUM BROMIDE 21 UG/1
SPRAY, METERED NASAL
Qty: 30 UNSPECIFIED | Status: ACTIVE | COMMUNITY

## 2021-04-27 RX ORDER — ZOLPIDEM TARTRATE 5 MG/1
1 TABLET AT BEDTIME TABLET ORAL ONCE A DAY
Status: ACTIVE | COMMUNITY

## 2021-04-27 NOTE — HPI-MIGRATED HPI
;   ;   ;   ;   ;   ;     Globus : Patient presents today for a follow up to her EGD. She admits/denies any complications after her procedure. Since the procedure patient admits/denies dysphagia, a globus sensation, any changes in her appetite or bowel habits.   Last visit (03/23/2021) Admits episodes of globus sensation that is more noticeable when laying down at night.  She will occasionally use Ipratropium bromide with some relief.  since her last office visit.    Last office visit (07/30/2020) Admit always feel as if mucus is in her esophagus.  She saw ENT  Dr Robin and was treated with Ipratropium bromide NS with relief in the past, but has not been able to get from [facility] pharmacy due to not available.   Last visit (7/1/2020) Patient denies episodes since last visit.  Patient admits the continued Famotidine Tablet, 20 MG, at bedtime.   Last visit (01/23/2020) Patient denies a consult with Dr Robin. She admits occasional globus sensation . She has a post - nasal drip        Last visit (10/24/2019) Admit globus sensation.;   Early Satiety :  Last visit (03/23/2021) Admits continued epidoses of early satiety.    Last office visit (07/30/2020) Admit some relief with consuming smaller portion meals.  Last visit (7/1/2020)  Patient admits episodes since last visit.  She admits that she has made Dietary modifications as well as eat slower and chewing more has helped.   Last visit (01/23/2020) Patient denies experiencing early satiety at this time.   Last visit (10/24/2019) She denies that she experience early satiety. She admits that she eats smaller meals and she chews slower.   Last visit (7/18/2019) Patient denies recent episodes of early satiety.  Last visit (3/27/2019)  Patient denies recent episodes of early satiety.  Last visit (5/23/2018) Patient denies any recent episodes of early satiety since her last office visit. She attributes improvement to eating smaller meals, completely chewing food prior to swallowing and following the low FODMAP diet.   At last visit (4/19/2018) Admit early satiety with onset 5 years ago. Eating smaller meals help, but she report still feeling hungry and she will eat more.;   Chest pain :        Last visit (03/23/2021) Denies any episodes of chest pain since her last office visit.     Last office visit (07/30/2020) Admit improvement in symptoms wince last visit.   Last visit (7/1/2020)  Patient admits NCCP episodes 1-2 times a month since last visit.  Last visit (01/23/2020) She denies any chest pain at this time.   Last visit (10/24/2019) She denies any chest pains at this time.   Last visit (7/18/2019) Patient denies recent episodes of chest pain.   Last visit (3/27/2019) Patient denies recent episodes of chest pain.  Last visit (5/23/2018) Patient denies any recent episodes of chest pain.   At last visit (4/19/2018) Admit a pinching sensation in mid chest that occurred three days ago. Lasting for a few seconds.;   Belching :  Last visit (03/23/2021) Admits periodic episodes of belching since her last office visit.    Last office visit (07/30/2020) Admit improvement since last visit,   Last visit (7/1/2020)Patient admits occasional belching but reports that symptoms have decreased.   Last visit (01/23/2020) She admits occasional belching. She reports that belching has decreased since her last office visit.    Last visit (10/24/2019) She admits belching episodes have resolved at this time.   Last visit (7/18/2019) Patient admits improvement of eructation. Patient admits following the low FODMAP diet     Last visit (3/27/2019)  Patient marks improvement of eructation.   Last visit (5/23/2018) Patient admits improvement of symptoms since starting the low FODMAP diet.  At last visit (4/19/2018) Admit frequent belching over the past 3-4 months.  Symptoms occur throughout the day.  Eating smaller meals help. She has been taking Metoclopramide 5 mg prn, and Famotidine 20 mg as needed since her EGD a little over 3 years ago  She report having an EGD a little over 3 years ago ?(2014) performed by Dr. Jeanette Parekh with findings of GERD and H. pylori. Patient report she was treated with Metoclopramide 5 mg prn, and Famotidine 20 mg as needed.;   Change in Bowel habits : 75- Year- old female patient presents today for follow up to her colonoscopy.  Colonoscopy was completed on 4/16/2021 by Dr. Guido with findings noted below. Patient admits/denies any complications after her procedure. Patient currently admits __ bowel movements ___.  Stools are __.  Patient admits/denies any melena, blood or mucus in stools.    Last visit (03/23/2021) 75-Year-old female patient presents today for a follow up in change in bowel habits. She currently reports 2-3 bowel movements per day, without strain. Denies any mucus, melena or blood in her stools. Denies any pruritus ani or rectal pain.    Of note: Patient was advised to have a colonoscopy and endoscopy in her previous visit and was sent at risk letter.   Last office visit (07/30/2020) Patient presents today to review labs, QDx stool study and follow up of change in bowel habits.  Currently admit 2-3 bowel movements per day with no strain.  Stools are soft pieces.  Patient denies blood, mucus, or melena in stools.   She had been adhering Low FODMAP diet   Last visit (7/1/2020) Patient denies that her bowel habits have returned to normal.   Patient currently admits 2-3 bowel movements per day with no strain.  Stools are soft.  Patient denies blood, mucus, or melena in stools.   Last visit (01/23/2020) Patient admits that her bowel habits are back to normal. She reports that she will have 3 bowel movements a day with no strain. She denies any rectal bleeding or rectal pain, or pruritus ani at this time.   Last visit (10/24/2019) Patient admits that her bowel habits have returned to normal. She admits 1-2 bowel movements a day with stools normal in form.    She denies following the Low FODMAP diet at this time. She admits that she did try the diet and she is now feeling better.   Get colonoscopy records from Dr BRYAN MACIEL.        Last visit (7/18/2019) Patient admits increase in bowel movements for the past 3 weeks. Patient admits to 3-4 bowel movements a day, with formed stool. Patient previous movements were once a day.;   Epigastric Pain :  Last visit (03/23/2021) Admits episodes of epigastric cramping sensation that is present post prandial since her last office visit.  Admit relief following evacuations.  She continue to adhere to low FODMAP diet and avoiding apples, plum, water melon, cherries, broccoli or onions.   Last office visit (07/30/2020) Admit improvement with adhering to low FODMAP diet and avoiding apples, plum, water melon, cherries, broccoli or onions.   Last visit (7/1/2020) Patient presents today for a follow up of epigastric pain.  Patient admits continued episodes of epigastric pain since last visit. She admits the pain is not as frequent. She will experience this pain 1-2 times a month.   She admits the use of Famotidine 20 MG at bedtime with mild relief of her symptoms. She denies that this controls her symptoms. She admits that she began using a homemade remedy that has helped reduce her symptoms.   She has food intolerance with sea food causing the epigastric pain   She admits that she has seen Dr Robin and her next visit with him will be in Oct/Nov.    Last visit (01/23/2020) Patient presents today for a follow up of epigastric pain.  Patient admits occasional epigastric pain only when she eats spicy food.   Patient admits that she didn't know that she was supposed to take this medication every night Famotidine 20 MG, 1 QHS. She states that she was taking it only once a week.   Patient denies a consult with Dr Robin.         Last visit (10/24/2019) Patient presents today for a follow up of epigastric pain. Patient admits that she has cut back on rice and french fries and she admits that her epigastric pain has subsided.   She has cut out dairy   Patient admits continued use of Ranitidine 150 mg prn.    Last visit (7/18/2019) Patient presents today for a follow up of epigastric pain, and to review ultrasound. Patient admits improvement of symptoms. Patient admits taking Ranitidine as needed.  Last visit (3/27/2019) She admit improvement of symptoms since her last office visit. She attributes this to following the low FODMAP diet and discontinuing spicy foods and Metoclopramide as she was advised to do at her previous office visit. Patient denies taking Ranitidine.  Last visit (5/23/2018) Patient presents today for a follow up of epigastric pain and to review recent MRI/MRCP results. Patient was also advised to complete a urea breath test at her last appointment, however results were Indeterminate.          Patient states that her symptoms have improved. She admits to having 1-2 episodes per week. She describes episodes as a discomfort occurring in her epigastric area. She does admit symptoms are worse when she is driving. She denies any aggravating factors.         Since her last visit she has discontinued Metoclopramide 5 mg prn, and Famotidine 20 mg as she was advised to do at her last office visit on 04/19/2018. In addition, patient admits to following the low FODMAP since her last visit.          At last visit (4/19/2018) Patient presents today at the request of Dr. Holm for a consultation of epigastric pain. Onset 5 years ago. Report 2-3 episodes occurred 3-4 months ago without provocation. Described as a pain, unsure how to describe. She has been taking Metoclopramide 5 mg prn, and Famotidine 20 mg as needed since her EGD a little over 3 years ago with relief of digestion.           Outside Labs:PCP (3/23/2018) CMP: (WNL) ALT 19, AST 22, Alk. Phosphatase 50, Albumin 4.6. CBC: Hgb 14.7, HCT 44.3.;

## 2021-05-06 ENCOUNTER — OFFICE VISIT (OUTPATIENT)
Dept: URBAN - METROPOLITAN AREA CLINIC 33 | Facility: CLINIC | Age: 76
End: 2021-05-06

## 2021-05-06 VITALS
OXYGEN SATURATION: 97 % | DIASTOLIC BLOOD PRESSURE: 64 MMHG | BODY MASS INDEX: 21.2 KG/M2 | WEIGHT: 108 LBS | HEIGHT: 60 IN | SYSTOLIC BLOOD PRESSURE: 110 MMHG

## 2021-05-06 PROBLEM — 72519002: Status: ACTIVE | Noted: 2021-05-06

## 2021-05-06 RX ORDER — MAGNESIUM OXIDE/MAG AA CHELATE 300 MG
1 CAPSULE WITH A MEAL CAPSULE ORAL ONCE A DAY
Status: ACTIVE | COMMUNITY

## 2021-05-06 RX ORDER — METOCLOPRAMIDE 5 MG/1
TABLET ORAL
Qty: 60 UNSPECIFIED | Status: DISCONTINUED | COMMUNITY

## 2021-05-06 RX ORDER — FLUCONAZOLE 100 MG/1
1 TABLET TABLET ORAL DAILY
Qty: 11 | Status: ACTIVE | COMMUNITY
Start: 2021-04-23

## 2021-05-06 RX ORDER — FAMOTIDINE 20 MG/1
1 TABLET AT BEDTIME AS NEEDED TABLET, FILM COATED ORAL ONCE A DAY
Qty: 90 TABLET | Refills: 1 | Status: DISCONTINUED | COMMUNITY
Start: 2019-10-24

## 2021-05-06 RX ORDER — POLYETHYLENE GLYCOL 3350, SODIUM SULFATE, SODIUM CHLORIDE, POTASSIUM CHLORIDE, ASCORBIC ACID, SODIUM ASCORBATE 140-9-5.2G
500 ML KIT ORAL
Qty: 1 KIT | Refills: 0 | Status: DISCONTINUED | COMMUNITY
Start: 2021-03-23

## 2021-05-06 RX ORDER — UBIDECARENONE/VIT E ACET 100MG-5
3 CAPSULES CAPSULE ORAL ONCE A DAY
Status: ACTIVE | COMMUNITY

## 2021-05-06 RX ORDER — IPRATROPIUM BROMIDE 21 UG/1
SPRAY, METERED NASAL
Qty: 30 UNSPECIFIED | Status: ACTIVE | COMMUNITY

## 2021-05-06 RX ORDER — FAMOTIDINE 20 MG/1
TABLET ORAL
Qty: 60 UNSPECIFIED | Status: ON HOLD | COMMUNITY

## 2021-05-06 RX ORDER — B-COMPLEX WITH VITAMIN C
AS DIRECTED TABLET ORAL
Status: ACTIVE | COMMUNITY

## 2021-05-06 RX ORDER — ZOLPIDEM TARTRATE 5 MG/1
1 TABLET AT BEDTIME TABLET ORAL ONCE A DAY
Status: ACTIVE | COMMUNITY

## 2021-05-06 RX ORDER — FAMOTIDINE 20 MG/1
TABLET ORAL
OUTPATIENT

## 2021-05-06 NOTE — HPI-MIGRATED HPI
;   ;   ;   ;   ;   ;     Globus : Patient presents today for a follow up to her EGD. She denies any complications after her procedure.  She continue to experience  a globus sensation daily.  Since the procedure patient denies dysphagia, any changes in her appetite or bowel habits.  SHe completed colurse of Diflucan 100 MG.   Last visit (03/23/2021) Admits episodes of globus sensation that is more noticeable when laying down at night.  She will occasionally use Ipratropium bromide with some relief.  since her last office visit.    Last office visit (07/30/2020) Admit always feel as if mucus is in her esophagus.  She saw ENT  Dr Robin and was treated with Ipratropium bromide NS with relief in the past, but has not been able to get from [facility] pharmacy due to not available.   Last visit (7/1/2020) Patient denies episodes since last visit.  Patient admits the continued Famotidine Tablet, 20 MG, at bedtime.   Last visit (01/23/2020) Patient denies a consult with Dr Robin. She admits occasional globus sensation . She has a post - nasal drip        Last visit (10/24/2019) Admit globus sensation.;   Early Satiety : Admit some relief with consuming smaller portion meals.  Last visit (03/23/2021) Admits continued epidoses of early satiety.    Last office visit (07/30/2020) Admit some relief with consuming smaller portion meals.  Last visit (7/1/2020)  Patient admits episodes since last visit.  She admits that she has made Dietary modifications as well as eat slower and chewing more has helped.   Last visit (01/23/2020) Patient denies experiencing early satiety at this time.   Last visit (10/24/2019) She denies that she experience early satiety. She admits that she eats smaller meals and she chews slower.   Last visit (7/18/2019) Patient denies recent episodes of early satiety.  Last visit (3/27/2019)  Patient denies recent episodes of early satiety.  Last visit (5/23/2018) Patient denies any recent episodes of early satiety since her last office visit. She attributes improvement to eating smaller meals, completely chewing food prior to swallowing and following the low FODMAP diet.   At last visit (4/19/2018) Admit early satiety with onset 5 years ago. Eating smaller meals help, but she report still feeling hungry and she will eat more.;   Chest pain : Continue to deny episodes of chest pain.   Last visit (03/23/2021) Denies any episodes of chest pain since her last office visit.     Last office visit (07/30/2020) Admit improvement in symptoms wince last visit.   Last visit (7/1/2020)  Patient admits NCCP episodes 1-2 times a month since last visit.  Last visit (01/23/2020) She denies any chest pain at this time.   Last visit (10/24/2019) She denies any chest pains at this time.   Last visit (7/18/2019) Patient denies recent episodes of chest pain.   Last visit (3/27/2019) Patient denies recent episodes of chest pain.  Last visit (5/23/2018) Patient denies any recent episodes of chest pain.   At last visit (4/19/2018) Admit a pinching sensation in mid chest that occurred three days ago. Lasting for a few seconds.;   Belching : Admit infrequent episodes.   Last visit (03/23/2021) Admits periodic episodes of belching since her last office visit.    Last office visit (07/30/2020) Admit improvement since last visit,   Last visit (7/1/2020)Patient admits occasional belching but reports that symptoms have decreased.   Last visit (01/23/2020) She admits occasional belching. She reports that belching has decreased since her last office visit.    Last visit (10/24/2019) She admits belching episodes have resolved at this time.   Last visit (7/18/2019) Patient admits improvement of eructation. Patient admits following the low FODMAP diet     Last visit (3/27/2019)  Patient marks improvement of eructation.   Last visit (5/23/2018) Patient admits improvement of symptoms since starting the low FODMAP diet.  At last visit (4/19/2018) Admit frequent belching over the past 3-4 months.  Symptoms occur throughout the day.  Eating smaller meals help. She has been taking Metoclopramide 5 mg prn, and Famotidine 20 mg as needed since her EGD a little over 3 years ago  She report having an EGD a little over 3 years ago ?(2014) performed by Dr. Jeanette Parekh with findings of GERD and H. pylori. Patient report she was treated with Metoclopramide 5 mg prn, and Famotidine 20 mg as needed.;   Change in Bowel habits : 75- Year- old female who presents today after her colonoscopy.  Patient denies any complications after her procedures.  Currently admits 2 bowel movements per day.  Denies any melena, blood or mucus in stools.  Denies rectal pain/bleeding.    Last visit (03/23/2021) 75-Year-old female patient presents today for a follow up in change in bowel habits. She currently reports 2-3 bowel movements per day, without strain. Denies any mucus, melena or blood in her stools. Denies any pruritus ani or rectal pain.    Of note: Patient was advised to have a colonoscopy and endoscopy in her previous visit and was sent at risk letter.   Last office visit (07/30/2020) Patient presents today to review labs, QDx stool study and follow up of change in bowel habits.  Currently admit 2-3 bowel movements per day with no strain.  Stools are soft pieces.  Patient denies blood, mucus, or melena in stools.   She had been adhering Low FODMAP diet   Last visit (7/1/2020) Patient denies that her bowel habits have returned to normal.   Patient currently admits 2-3 bowel movements per day with no strain.  Stools are soft.  Patient denies blood, mucus, or melena in stools.   Last visit (01/23/2020) Patient admits that her bowel habits are back to normal. She reports that she will have 3 bowel movements a day with no strain. She denies any rectal bleeding or rectal pain, or pruritus ani at this time.   Last visit (10/24/2019) Patient admits that her bowel habits have returned to normal. She admits 1-2 bowel movements a day with stools normal in form.    She denies following the Low FODMAP diet at this time. She admits that she did try the diet and she is now feeling better.   Get colonoscopy records from Dr BRYAN [facility].        Last visit (7/18/2019) Patient admits increase in bowel movements for the past 3 weeks. Patient admits to 3-4 bowel movements a day, with formed stool. Patient previous movements were once a day.;   Epigastric Pain : Denies epigastric symptoms since last visit.   Last visit (03/23/2021) Admits episodes of epigastric cramping sensation that is present post prandial since her last office visit.  Admit relief following evacuations.  She continue to adhere to low FODMAP diet and avoiding apples, plum, water melon, cherries, broccoli or onions.   Last office visit (07/30/2020) Admit improvement with adhering to low FODMAP diet and avoiding apples, plum, water melon, cherries, broccoli or onions.   Last visit (7/1/2020) Patient presents today for a follow up of epigastric pain.  Patient admits continued episodes of epigastric pain since last visit. She admits the pain is not as frequent. She will experience this pain 1-2 times a month.   She admits the use of Famotidine 20 MG at bedtime with mild relief of her symptoms. She denies that this controls her symptoms. She admits that she began using a homemade remedy that has helped reduce her symptoms.   She has food intolerance with sea food causing the epigastric pain   She admits that she has seen Dr Robin and her next visit with him will be in Oct/Nov.    Last visit (01/23/2020) Patient presents today for a follow up of epigastric pain.  Patient admits occasional epigastric pain only when she eats spicy food.   Patient admits that she didn't know that she was supposed to take this medication every night Famotidine 20 MG, 1 QHS. She states that she was taking it only once a week.   Patient denies a consult with Dr Robin.         Last visit (10/24/2019) Patient presents today for a follow up of epigastric pain. Patient admits that she has cut back on rice and french fries and she admits that her epigastric pain has subsided.   She has cut out dairy   Patient admits continued use of Ranitidine 150 mg prn.    Last visit (7/18/2019) Patient presents today for a follow up of epigastric pain, and to review ultrasound. Patient admits improvement of symptoms. Patient admits taking Ranitidine as needed.  Last visit (3/27/2019) She admit improvement of symptoms since her last office visit. She attributes this to following the low FODMAP diet and discontinuing spicy foods and Metoclopramide as she was advised to do at her previous office visit. Patient denies taking Ranitidine.  Last visit (5/23/2018) Patient presents today for a follow up of epigastric pain and to review recent MRI/MRCP results. Patient was also advised to complete a urea breath test at her last appointment, however results were Indeterminate.          Patient states that her symptoms have improved. She admits to having 1-2 episodes per week. She describes episodes as a discomfort occurring in her epigastric area. She does admit symptoms are worse when she is driving. She denies any aggravating factors.         Since her last visit she has discontinued Metoclopramide 5 mg prn, and Famotidine 20 mg as she was advised to do at her last office visit on 04/19/2018. In addition, patient admits to following the low FODMAP since her last visit.          At last visit (4/19/2018) Patient presents today at the request of Dr. Holm for a consultation of epigastric pain. Onset 5 years ago. Report 2-3 episodes occurred 3-4 months ago without provocation. Described as a pain, unsure how to describe. She has been taking Metoclopramide 5 mg prn, and Famotidine 20 mg as needed since her EGD a little over 3 years ago with relief of digestion.           Outside Labs:PCP (3/23/2018) CMP: (WNL) ALT 19, AST 22, Alk. Phosphatase 50, Albumin 4.6. CBC: Hgb 14.7, HCT 44.3.;

## 2021-05-11 ENCOUNTER — OFFICE VISIT (OUTPATIENT)
Dept: URBAN - METROPOLITAN AREA CLINIC 35 | Facility: CLINIC | Age: 76
End: 2021-05-11

## 2021-06-11 ENCOUNTER — LAB REQUISITION (OUTPATIENT)
Dept: LAB | Age: 76
End: 2021-06-11

## 2021-06-11 DIAGNOSIS — B35.1 TINEA UNGUIUM: ICD-10-CM

## 2021-06-11 DIAGNOSIS — I10 ESSENTIAL (PRIMARY) HYPERTENSION: ICD-10-CM

## 2021-06-11 DIAGNOSIS — Z83.3 FAMILY HISTORY OF DIABETES MELLITUS: ICD-10-CM

## 2021-06-11 DIAGNOSIS — E78.5 HYPERLIPIDEMIA, UNSPECIFIED: ICD-10-CM

## 2021-06-11 DIAGNOSIS — E55.9 VITAMIN D DEFICIENCY, UNSPECIFIED: ICD-10-CM

## 2021-07-05 ENCOUNTER — HOSPITAL ENCOUNTER (OUTPATIENT)
Dept: GENERAL RADIOLOGY | Age: 76
Discharge: HOME | End: 2021-07-05
Attending: INTERNAL MEDICINE

## 2021-07-05 ENCOUNTER — HOSPITAL ENCOUNTER (OUTPATIENT)
Dept: MAMMOGRAPHY | Age: 76
Discharge: HOME | End: 2021-07-05
Attending: INTERNAL MEDICINE

## 2021-07-05 DIAGNOSIS — M54.50 LOW BACK PAIN: ICD-10-CM

## 2021-07-05 DIAGNOSIS — M81.0 OSTEOPOROSIS: ICD-10-CM

## 2021-07-05 PROCEDURE — 72110 X-RAY EXAM L-2 SPINE 4/>VWS: CPT

## 2021-07-05 PROCEDURE — 72072 X-RAY EXAM THORAC SPINE 3VWS: CPT

## 2021-07-05 PROCEDURE — 77080 DXA BONE DENSITY AXIAL: CPT

## 2021-07-09 ENCOUNTER — LAB SERVICES (OUTPATIENT)
Dept: LAB | Age: 76
End: 2021-07-09

## 2021-07-09 ENCOUNTER — LAB REQUISITION (OUTPATIENT)
Dept: LAB | Age: 76
End: 2021-07-09

## 2021-07-09 DIAGNOSIS — I10 ESSENTIAL (PRIMARY) HYPERTENSION: ICD-10-CM

## 2021-07-09 DIAGNOSIS — B35.1 TINEA UNGUIUM: ICD-10-CM

## 2021-07-09 DIAGNOSIS — E55.9 VITAMIN D DEFICIENCY, UNSPECIFIED: ICD-10-CM

## 2021-07-09 DIAGNOSIS — Z83.3 FAMILY HISTORY OF DIABETES MELLITUS: ICD-10-CM

## 2021-07-09 DIAGNOSIS — E78.5 HYPERLIPIDEMIA, UNSPECIFIED: ICD-10-CM

## 2021-07-09 LAB
25(OH)D3+25(OH)D2 SERPL-MCNC: 56.8 NG/ML (ref 30–100)
ALBUMIN SERPL-MCNC: 3.6 G/DL (ref 3.6–5.1)
ALBUMIN/GLOB SERPL: 1.1 {RATIO} (ref 1–2.4)
ALP SERPL-CCNC: 92 UNITS/L (ref 45–117)
ALT SERPL-CCNC: 22 UNITS/L
ANION GAP SERPL CALC-SCNC: 6 MMOL/L (ref 10–20)
AST SERPL-CCNC: 17 UNITS/L
BASOPHILS # BLD: 0 K/MCL (ref 0–0.3)
BASOPHILS NFR BLD: 0 %
BILIRUB SERPL-MCNC: 0.6 MG/DL (ref 0.2–1)
BUN SERPL-MCNC: 12 MG/DL (ref 6–20)
BUN/CREAT SERPL: 10 (ref 7–25)
CALCIUM SERPL-MCNC: 9.3 MG/DL (ref 8.4–10.2)
CHLORIDE SERPL-SCNC: 109 MMOL/L (ref 98–107)
CHOLEST SERPL-MCNC: 186 MG/DL
CHOLEST/HDLC SERPL: 2.8 {RATIO}
CO2 SERPL-SCNC: 29 MMOL/L (ref 21–32)
CREAT SERPL-MCNC: 1.2 MG/DL (ref 0.51–0.95)
DEPRECATED RDW RBC: 46.5 FL (ref 39–50)
EOSINOPHIL # BLD: 0.4 K/MCL (ref 0–0.5)
EOSINOPHIL NFR BLD: 5 %
ERYTHROCYTE [DISTWIDTH] IN BLOOD: 12.3 % (ref 11–15)
FASTING DURATION TIME PATIENT: ABNORMAL H
FASTING DURATION TIME PATIENT: ABNORMAL H
GFR SERPLBLD BASED ON 1.73 SQ M-ARVRAT: 45 ML/MIN/1.73M2
GLOBULIN SER-MCNC: 3.2 G/DL (ref 2–4)
GLUCOSE SERPL-MCNC: 105 MG/DL (ref 65–99)
HBA1C MFR BLD: 5.9 % (ref 4.5–5.6)
HCT VFR BLD CALC: 42.3 % (ref 36–46.5)
HDLC SERPL-MCNC: 67 MG/DL
HGB BLD-MCNC: 13.7 G/DL (ref 12–15.5)
IMM GRANULOCYTES # BLD AUTO: 0 K/MCL (ref 0–0.2)
IMM GRANULOCYTES # BLD: 0 %
LDLC SERPL CALC-MCNC: 88 MG/DL
LYMPHOCYTES # BLD: 2.1 K/MCL (ref 1–4)
LYMPHOCYTES NFR BLD: 28 %
MCH RBC QN AUTO: 33.2 PG (ref 26–34)
MCHC RBC AUTO-ENTMCNC: 32.4 G/DL (ref 32–36.5)
MCV RBC AUTO: 102.4 FL (ref 78–100)
MONOCYTES # BLD: 0.6 K/MCL (ref 0.3–0.9)
MONOCYTES NFR BLD: 8 %
NEUTROPHILS # BLD: 4.4 K/MCL (ref 1.8–7.7)
NEUTROPHILS NFR BLD: 59 %
NONHDLC SERPL-MCNC: 119 MG/DL
NRBC BLD MANUAL-RTO: 0 /100 WBC
PLATELET # BLD AUTO: 204 K/MCL (ref 140–450)
POTASSIUM SERPL-SCNC: 4.4 MMOL/L (ref 3.4–5.1)
PROT SERPL-MCNC: 6.8 G/DL (ref 6.4–8.2)
RAINBOW EXTRA TUBES HOLD SPECIMEN: NORMAL
RBC # BLD: 4.13 MIL/MCL (ref 4–5.2)
SODIUM SERPL-SCNC: 140 MMOL/L (ref 135–145)
T4 FREE SERPL-MCNC: 1 NG/DL (ref 0.8–1.5)
TRIGL SERPL-MCNC: 155 MG/DL
TSH SERPL-ACNC: 1.87 MCUNITS/ML (ref 0.35–5)
VIT B12 SERPL-MCNC: 409 PG/ML (ref 211–911)
WBC # BLD: 7.5 K/MCL (ref 4.2–11)

## 2021-07-09 PROCEDURE — 84439 ASSAY OF FREE THYROXINE: CPT | Performed by: CLINICAL MEDICAL LABORATORY

## 2021-07-09 PROCEDURE — 85025 COMPLETE CBC W/AUTO DIFF WBC: CPT | Performed by: CLINICAL MEDICAL LABORATORY

## 2021-07-09 PROCEDURE — 82306 VITAMIN D 25 HYDROXY: CPT | Performed by: CLINICAL MEDICAL LABORATORY

## 2021-07-09 PROCEDURE — 82607 VITAMIN B-12: CPT | Performed by: CLINICAL MEDICAL LABORATORY

## 2021-07-09 PROCEDURE — 83036 HEMOGLOBIN GLYCOSYLATED A1C: CPT | Performed by: CLINICAL MEDICAL LABORATORY

## 2021-07-09 PROCEDURE — 84443 ASSAY THYROID STIM HORMONE: CPT | Performed by: CLINICAL MEDICAL LABORATORY

## 2021-07-09 PROCEDURE — 36415 COLL VENOUS BLD VENIPUNCTURE: CPT | Performed by: CLINICAL MEDICAL LABORATORY

## 2021-07-09 PROCEDURE — 80053 COMPREHEN METABOLIC PANEL: CPT | Performed by: CLINICAL MEDICAL LABORATORY

## 2021-07-09 PROCEDURE — 80061 LIPID PANEL: CPT | Performed by: CLINICAL MEDICAL LABORATORY

## 2021-07-19 ENCOUNTER — TELEPHONE ENCOUNTER (OUTPATIENT)
Dept: URBAN - METROPOLITAN AREA CLINIC 35 | Facility: CLINIC | Age: 76
End: 2021-07-19

## 2021-07-21 ENCOUNTER — OFFICE VISIT (OUTPATIENT)
Dept: URBAN - METROPOLITAN AREA SURGERY CENTER 8 | Facility: SURGERY CENTER | Age: 76
End: 2021-07-21

## 2021-07-27 ENCOUNTER — LAB REQUISITION (OUTPATIENT)
Dept: LAB | Age: 76
End: 2021-07-27

## 2021-07-27 DIAGNOSIS — N18.9 CHRONIC KIDNEY DISEASE, UNSPECIFIED: ICD-10-CM

## 2021-07-27 DIAGNOSIS — B35.1 TINEA UNGUIUM: ICD-10-CM

## 2021-08-05 ENCOUNTER — OFFICE VISIT (OUTPATIENT)
Dept: URBAN - METROPOLITAN AREA CLINIC 33 | Facility: CLINIC | Age: 76
End: 2021-08-05

## 2021-08-05 VITALS
HEART RATE: 75 BPM | WEIGHT: 108 LBS | OXYGEN SATURATION: 97 % | BODY MASS INDEX: 21.2 KG/M2 | SYSTOLIC BLOOD PRESSURE: 120 MMHG | DIASTOLIC BLOOD PRESSURE: 60 MMHG | HEIGHT: 60 IN

## 2021-08-05 RX ORDER — IPRATROPIUM BROMIDE 21 UG/1
SPRAY, METERED NASAL
Qty: 30 UNSPECIFIED | Status: ACTIVE | COMMUNITY

## 2021-08-05 RX ORDER — FLUCONAZOLE 100 MG/1
1 TABLET TABLET ORAL DAILY
Qty: 11 | Status: ON HOLD | COMMUNITY
Start: 2021-04-23

## 2021-08-05 RX ORDER — B-COMPLEX WITH VITAMIN C
AS DIRECTED TABLET ORAL
Status: ON HOLD | COMMUNITY

## 2021-08-05 RX ORDER — ZOLPIDEM TARTRATE 5 MG/1
1 TABLET AT BEDTIME TABLET ORAL ONCE A DAY
Status: ACTIVE | COMMUNITY

## 2021-08-05 RX ORDER — MAGNESIUM OXIDE/MAG AA CHELATE 300 MG
1 CAPSULE WITH A MEAL CAPSULE ORAL ONCE A DAY
Status: ON HOLD | COMMUNITY

## 2021-08-05 RX ORDER — AMOXICILLIN 500 MG
AS DIRECTED CAPSULE ORAL
Status: ACTIVE | COMMUNITY

## 2021-08-05 RX ORDER — FAMOTIDINE 20 MG/1
1 TABLET AT BEDTIME AS NEEDED TABLET, FILM COATED ORAL ONCE A DAY
Qty: 30 | Refills: 1 | OUTPATIENT
Start: 2021-08-05

## 2021-08-05 RX ORDER — UBIDECARENONE/VIT E ACET 100MG-5
3 CAPSULES CAPSULE ORAL ONCE A DAY
Status: ACTIVE | COMMUNITY

## 2021-08-05 RX ORDER — FAMOTIDINE 20 MG/1
TABLET ORAL
Status: ON HOLD | COMMUNITY

## 2021-08-05 NOTE — HPI-MIGRATED HPI
;   ;   ;   ;   ;   ;     Globus : She continue to experience  a globus sensation daily.  Since the procedure patient denies dysphagia, any changes in her appetite or bowel habits.   Lastg visit (5/6/2021) Patient presents today for a follow up to her EGD. She denies any complications after her procedure.  She continue to experience  a globus sensation daily.  Since the procedure patient denies dysphagia, any changes in her appetite or bowel habits.  SHe completed colurse of Diflucan 100 MG.   Last visit (03/23/2021) Admits episodes of globus sensation that is more noticeable when laying down at night.  She will occasionally use Ipratropium bromide with some relief.  since her last office visit.    Last office visit (07/30/2020) Admit always feel as if mucus is in her esophagus.  She saw ENT  Dr Robin and was treated with Ipratropium bromide NS with relief in the past, but has not been able to get from [facility] pharmacy due to not available.   Last visit (7/1/2020) Patient denies episodes since last visit.  Patient admits the continued Famotidine Tablet, 20 MG, at bedtime.   Last visit (01/23/2020) Patient denies a consult with Dr Robin. She admits occasional globus sensation . She has a post - nasal drip        Last visit (10/24/2019) Admit globus sensation.;   Early Satiety : Patient reports that she will eat smaller portions and states that she has made some dietary modifications with minor improvement   Last visit (5/6/2021) Admit some relief with consuming smaller portion meals.  Last visit (03/23/2021) Admits continued epidoses of early satiety.    Last office visit (07/30/2020) Admit some relief with consuming smaller portion meals.  Last visit (7/1/2020)  Patient admits episodes since last visit.  She admits that she has made Dietary modifications as well as eat slower and chewing more has helped.   Last visit (01/23/2020) Patient denies experiencing early satiety at this time.   Last visit (10/24/2019) She denies that she experience early satiety. She admits that she eats smaller meals and she chews slower.   Last visit (7/18/2019) Patient denies recent episodes of early satiety.  Last visit (3/27/2019)  Patient denies recent episodes of early satiety.  Last visit (5/23/2018) Patient denies any recent episodes of early satiety since her last office visit. She attributes improvement to eating smaller meals, completely chewing food prior to swallowing and following the low FODMAP diet.   At last visit (4/19/2018) Admit early satiety with onset 5 years ago. Eating smaller meals help, but she report still feeling hungry and she will eat more.;   Chest pain : She denies any chest pain since her last visit.    Last visit (5/6/2021) Continue to deny episodes of chest pain.   Last visit (03/23/2021) Denies any episodes of chest pain since her last office visit.     Last office visit (07/30/2020) Admit improvement in symptoms wince last visit.   Last visit (7/1/2020)  Patient admits NCCP episodes 1-2 times a month since last visit.  Last visit (01/23/2020) She denies any chest pain at this time.   Last visit (10/24/2019) She denies any chest pains at this time.   Last visit (7/18/2019) Patient denies recent episodes of chest pain.   Last visit (3/27/2019) Patient denies recent episodes of chest pain.  Last visit (5/23/2018) Patient denies any recent episodes of chest pain.   At last visit (4/19/2018) Admit a pinching sensation in mid chest that occurred three days ago. Lasting for a few seconds.;   Belching : Patient reports night time symptoms of belching. She admits that she has to hit her chest with her fist and she will start to belch and the fullness and discomfort will subside.   Last visit (5/6/2021) Admit infrequent episodes.   Last visit (03/23/2021) Admits periodic episodes of belching since her last office visit.    Last office visit (07/30/2020) Admit improvement since last visit,   Last visit (7/1/2020)Patient admits occasional belching but reports that symptoms have decreased.   Last visit (01/23/2020) She admits occasional belching. She reports that belching has decreased since her last office visit.    Last visit (10/24/2019) She admits belching episodes have resolved at this time.   Last visit (7/18/2019) Patient admits improvement of eructation. Patient admits following the low FODMAP diet     Last visit (3/27/2019)  Patient marks improvement of eructation.   Last visit (5/23/2018) Patient admits improvement of symptoms since starting the low FODMAP diet.  At last visit (4/19/2018) Admit frequent belching over the past 3-4 months.  Symptoms occur throughout the day.  Eating smaller meals help. She has been taking Metoclopramide 5 mg prn, and Famotidine 20 mg as needed since her EGD a little over 3 years ago  She report having an EGD a little over 3 years ago ?(2014) performed by Dr. Jeanette Parekh with findings of GERD and H. pylori. Patient report she was treated with Metoclopramide 5 mg prn, and Famotidine 20 mg as needed.;   Change in Bowel habits : Patient reports that her stools have not returned to normal at this time.   Currently admits 2 bowel movements per day. Her stools alternate between formed and loose pieces. Denies any melena, blood or mucus in stools.  Denies rectal pain.  Patient has been placed on a 5 year surveillance for a colonoscopy. She is due 4/2026.    Last visit (5/6/2021)  75- Year- old female who presents today after her colonoscopy.  Patient denies any complications after her procedures.  Currently admits 2 bowel movements per day.  Denies any melena, blood or mucus in stools.  Denies rectal pain/bleeding.    Last visit (03/23/2021) 75-Year-old female patient presents today for a follow up in change in bowel habits. She currently reports 2-3 bowel movements per day, without strain. Denies any mucus, melena or blood in her stools. Denies any pruritus ani or rectal pain.    Of note: Patient was advised to have a colonoscopy and endoscopy in her previous visit and was sent at risk letter.   Last office visit (07/30/2020) Patient presents today to review labs, QDx stool study and follow up of change in bowel habits.  Currently admit 2-3 bowel movements per day with no strain.  Stools are soft pieces.  Patient denies blood, mucus, or melena in stools.   She had been adhering Low FODMAP diet   Last visit (7/1/2020) Patient denies that her bowel habits have returned to normal.   Patient currently admits 2-3 bowel movements per day with no strain.  Stools are soft.  Patient denies blood, mucus, or melena in stools.   Last visit (01/23/2020) Patient admits that her bowel habits are back to normal. She reports that she will have 3 bowel movements a day with no strain. She denies any rectal bleeding or rectal pain, or pruritus ani at this time.   Last visit (10/24/2019) Patient admits that her bowel habits have returned to normal. She admits 1-2 bowel movements a day with stools normal in form.    She denies following the Low FODMAP diet at this time. She admits that she did try the diet and she is now feeling better.   Get colonoscopy records from Dr BRYAN MACIEL.        Last visit (7/18/2019) Patient admits increase in bowel movements for the past 3 weeks. Patient admits to 3-4 bowel movements a day, with formed stool. Patient previous movements were once a day.;   Epigastric Pain : Patient presents today after her EGD and follow up of epigastric pain. She denies any complications after her procedure.  She continue to experience  a globus sensation daily.  Since the procedure patient denies dysphagia, any changes in her appetite or bowel habits.   Patient reports occasional epigastic discomfort since her EGD that will resolve after a bowel movement.    Last visit (5/6/2021) Denies epigastric symptoms since last visit.   Last visit (03/23/2021) Admits episodes of epigastric cramping sensation that is present post prandial since her last office visit.  Admit relief following evacuations.  She continue to adhere to low FODMAP diet and avoiding apples, plum, water melon, cherries, broccoli or onions.   Last office visit (07/30/2020) Admit improvement with adhering to low FODMAP diet and avoiding apples, plum, water melon, cherries, broccoli or onions.   Last visit (7/1/2020) Patient presents today for a follow up of epigastric pain.  Patient admits continued episodes of epigastric pain since last visit. She admits the pain is not as frequent. She will experience this pain 1-2 times a month.   She admits the use of Famotidine 20 MG at bedtime with mild relief of her symptoms. She denies that this controls her symptoms. She admits that she began using a homemade remedy that has helped reduce her symptoms.   She has food intolerance with sea food causing the epigastric pain   She admits that she has seen Dr Robin and her next visit with him will be in Oct/Nov.    Last visit (01/23/2020) Patient presents today for a follow up of epigastric pain.  Patient admits occasional epigastric pain only when she eats spicy food.   Patient admits that she didn't know that she was supposed to take this medication every night Famotidine 20 MG, 1 QHS. She states that she was taking it only once a week.   Patient denies a consult with Dr Robin.         Last visit (10/24/2019) Patient presents today for a follow up of epigastric pain. Patient admits that she has cut back on rice and french fries and she admits that her epigastric pain has subsided.   She has cut out dairy   Patient admits continued use of Ranitidine 150 mg prn.    Last visit (7/18/2019) Patient presents today for a follow up of epigastric pain, and to review ultrasound. Patient admits improvement of symptoms. Patient admits taking Ranitidine as needed.  Last visit (3/27/2019) She admit improvement of symptoms since her last office visit. She attributes this to following the low FODMAP diet and discontinuing spicy foods and Metoclopramide as she was advised to do at her previous office visit. Patient denies taking Ranitidine.  Last visit (5/23/2018) Patient presents today for a follow up of epigastric pain and to review recent MRI/MRCP results. Patient was also advised to complete a urea breath test at her last appointment, however results were Indeterminate.          Patient states that her symptoms have improved. She admits to having 1-2 episodes per week. She describes episodes as a discomfort occurring in her epigastric area. She does admit symptoms are worse when she is driving. She denies any aggravating factors.         Since her last visit she has discontinued Metoclopramide 5 mg prn, and Famotidine 20 mg as she was advised to do at her last office visit on 04/19/2018. In addition, patient admits to following the low FODMAP since her last visit.          At last visit (4/19/2018) Patient presents today at the request of Dr. Holm for a consultation of epigastric pain. Onset 5 years ago. Report 2-3 episodes occurred 3-4 months ago without provocation. Described as a pain, unsure how to describe. She has been taking Metoclopramide 5 mg prn, and Famotidine 20 mg as needed since her EGD a little over 3 years ago with relief of digestion.           Outside Labs:PCP (3/23/2018) CMP: (WNL) ALT 19, AST 22, Alk. Phosphatase 50, Albumin 4.6. CBC: Hgb 14.7, HCT 44.3.;

## 2021-08-26 ENCOUNTER — HOSPITAL ENCOUNTER (OUTPATIENT)
Dept: MAMMOGRAPHY | Age: 76
Discharge: HOME | End: 2021-08-26
Attending: INTERNAL MEDICINE

## 2021-08-26 DIAGNOSIS — Z12.31 VISIT FOR SCREENING MAMMOGRAM: ICD-10-CM

## 2021-08-26 PROCEDURE — 77063 BREAST TOMOSYNTHESIS BI: CPT

## 2021-10-11 DIAGNOSIS — R74.8 ELEVATED CREATINE KINASE LEVEL: Primary | ICD-10-CM

## 2021-10-15 ENCOUNTER — HOSPITAL ENCOUNTER (OUTPATIENT)
Dept: ULTRASOUND IMAGING | Age: 76
Discharge: HOME | End: 2021-10-15
Attending: INTERNAL MEDICINE

## 2021-10-15 DIAGNOSIS — R74.8 ELEVATED CREATINE KINASE LEVEL: ICD-10-CM

## 2021-10-15 PROCEDURE — 76775 US EXAM ABDO BACK WALL LIM: CPT

## 2021-10-21 ENCOUNTER — LAB SERVICES (OUTPATIENT)
Dept: LAB | Age: 76
End: 2021-10-21

## 2021-10-21 LAB
ALBUMIN SERPL-MCNC: 3.4 G/DL (ref 3.6–5.1)
ALBUMIN/GLOB SERPL: 0.9 {RATIO} (ref 1–2.4)
ALP SERPL-CCNC: 131 UNITS/L (ref 45–117)
ALT SERPL-CCNC: 27 UNITS/L
ANION GAP SERPL CALC-SCNC: 5 MMOL/L (ref 10–20)
AST SERPL-CCNC: 23 UNITS/L
BILIRUB SERPL-MCNC: 0.4 MG/DL (ref 0.2–1)
BUN SERPL-MCNC: 16 MG/DL (ref 6–20)
BUN/CREAT SERPL: 13 (ref 7–25)
CALCIUM SERPL-MCNC: 9.2 MG/DL (ref 8.4–10.2)
CHLORIDE SERPL-SCNC: 109 MMOL/L (ref 98–107)
CO2 SERPL-SCNC: 28 MMOL/L (ref 21–32)
CREAT SERPL-MCNC: 1.19 MG/DL (ref 0.51–0.95)
FASTING DURATION TIME PATIENT: 10 HOURS (ref 0–999)
GFR SERPLBLD BASED ON 1.73 SQ M-ARVRAT: 45 ML/MIN
GLOBULIN SER-MCNC: 3.7 G/DL (ref 2–4)
GLUCOSE SERPL-MCNC: 98 MG/DL (ref 70–99)
POTASSIUM SERPL-SCNC: 4.1 MMOL/L (ref 3.4–5.1)
PROT SERPL-MCNC: 7.1 G/DL (ref 6.4–8.2)
SODIUM SERPL-SCNC: 138 MMOL/L (ref 135–145)

## 2021-10-21 PROCEDURE — 80053 COMPREHEN METABOLIC PANEL: CPT | Performed by: CLINICAL MEDICAL LABORATORY

## 2021-10-21 PROCEDURE — 36415 COLL VENOUS BLD VENIPUNCTURE: CPT | Performed by: CLINICAL MEDICAL LABORATORY

## 2021-10-26 ENCOUNTER — LAB REQUISITION (OUTPATIENT)
Dept: LAB | Age: 76
End: 2021-10-26

## 2021-10-26 DIAGNOSIS — B35.1 TINEA UNGUIUM: ICD-10-CM

## 2022-01-25 ENCOUNTER — LAB SERVICES (OUTPATIENT)
Dept: LAB | Age: 77
End: 2022-01-25

## 2022-01-25 LAB
ALBUMIN SERPL-MCNC: 3.9 G/DL (ref 3.6–5.1)
ALBUMIN/GLOB SERPL: 1.3 {RATIO} (ref 1–2.4)
ALP SERPL-CCNC: 124 UNITS/L (ref 45–117)
ALT SERPL-CCNC: 21 UNITS/L
ANION GAP SERPL CALC-SCNC: 9 MMOL/L (ref 10–20)
AST SERPL-CCNC: 23 UNITS/L
BILIRUB SERPL-MCNC: 0.4 MG/DL (ref 0.2–1)
BUN SERPL-MCNC: 21 MG/DL (ref 6–20)
BUN/CREAT SERPL: 16 (ref 7–25)
CALCIUM SERPL-MCNC: 9.4 MG/DL (ref 8.4–10.2)
CHLORIDE SERPL-SCNC: 109 MMOL/L (ref 98–107)
CO2 SERPL-SCNC: 29 MMOL/L (ref 21–32)
CREAT SERPL-MCNC: 1.29 MG/DL (ref 0.51–0.95)
FASTING DURATION TIME PATIENT: 10 HOURS (ref 0–999)
GFR SERPLBLD BASED ON 1.73 SQ M-ARVRAT: 41 ML/MIN
GLOBULIN SER-MCNC: 3 G/DL (ref 2–4)
GLUCOSE SERPL-MCNC: 99 MG/DL (ref 70–99)
POTASSIUM SERPL-SCNC: 4.5 MMOL/L (ref 3.4–5.1)
PROT SERPL-MCNC: 6.9 G/DL (ref 6.4–8.2)
SODIUM SERPL-SCNC: 142 MMOL/L (ref 135–145)

## 2022-01-25 PROCEDURE — 36415 COLL VENOUS BLD VENIPUNCTURE: CPT | Performed by: CLINICAL MEDICAL LABORATORY

## 2022-01-25 PROCEDURE — 80053 COMPREHEN METABOLIC PANEL: CPT | Performed by: CLINICAL MEDICAL LABORATORY

## 2022-01-27 ENCOUNTER — LAB REQUISITION (OUTPATIENT)
Dept: LAB | Age: 77
End: 2022-01-27

## 2022-01-27 DIAGNOSIS — E78.5 HYPERLIPIDEMIA, UNSPECIFIED: ICD-10-CM

## 2022-01-27 DIAGNOSIS — J44.9 CHRONIC OBSTRUCTIVE PULMONARY DISEASE, UNSPECIFIED (CMD): ICD-10-CM

## 2022-01-27 DIAGNOSIS — B35.1 TINEA UNGUIUM: ICD-10-CM

## 2022-02-03 ENCOUNTER — OFFICE VISIT (OUTPATIENT)
Dept: URBAN - METROPOLITAN AREA CLINIC 33 | Facility: CLINIC | Age: 77
End: 2022-02-03
Payer: MEDICARE

## 2022-02-03 VITALS
DIASTOLIC BLOOD PRESSURE: 72 MMHG | HEART RATE: 68 BPM | SYSTOLIC BLOOD PRESSURE: 118 MMHG | BODY MASS INDEX: 21.4 KG/M2 | WEIGHT: 109 LBS | OXYGEN SATURATION: 97 % | HEIGHT: 60 IN

## 2022-02-03 DIAGNOSIS — K31.89 INTESTINAL METAPLASIA OF GASTRIC MUCOSA: ICD-10-CM

## 2022-02-03 DIAGNOSIS — K29.30 CHRONIC SUPERFICIAL GASTRITIS WITHOUT BLEEDING: ICD-10-CM

## 2022-02-03 DIAGNOSIS — K21.9 ACID REFLUX: ICD-10-CM

## 2022-02-03 DIAGNOSIS — R07.89 OTHER CHEST PAIN: ICD-10-CM

## 2022-02-03 PROBLEM — 92448001 BENIGN NEOPLASM OF TRANSVERSE COLON: Status: ACTIVE | Noted: 2022-02-03

## 2022-02-03 PROBLEM — 92343006 BENIGN NEOPLASM OF SIGMOID COLON: Status: ACTIVE | Noted: 2021-05-06

## 2022-02-03 PROBLEM — 398050005 DIVERTICULAR DISEASE OF COLON: Status: ACTIVE | Noted: 2021-05-06

## 2022-02-03 PROCEDURE — 99213 OFFICE O/P EST LOW 20 MIN: CPT | Performed by: INTERNAL MEDICINE

## 2022-02-03 RX ORDER — UBIDECARENONE/VIT E ACET 100MG-5
3 CAPSULES CAPSULE ORAL ONCE A DAY
Status: ACTIVE | COMMUNITY

## 2022-02-03 RX ORDER — B-COMPLEX WITH VITAMIN C
AS DIRECTED TABLET ORAL
Status: ACTIVE | COMMUNITY

## 2022-02-03 RX ORDER — DICYCLOMINE HYDROCHLORIDE 10 MG/1
1 CAPSULE CAPSULE ORAL
Qty: 60 | Refills: 1 | OUTPATIENT
Start: 2022-02-03 | End: 2022-03-05

## 2022-02-03 RX ORDER — OMEPRAZOLE 40 MG/1
1 CAPSULE 30 MINUTES BEFORE MORNING MEAL CAPSULE, DELAYED RELEASE ORAL ONCE A DAY
Status: ACTIVE | COMMUNITY

## 2022-02-03 RX ORDER — AMOXICILLIN 500 MG
AS DIRECTED CAPSULE ORAL
Status: ACTIVE | COMMUNITY

## 2022-02-03 RX ORDER — ZOLPIDEM TARTRATE 5 MG/1
1 TABLET AT BEDTIME TABLET ORAL ONCE A DAY
Status: ACTIVE | COMMUNITY

## 2022-02-03 RX ORDER — MAGNESIUM OXIDE/MAG AA CHELATE 300 MG
1 CAPSULE WITH A MEAL CAPSULE ORAL ONCE A DAY
Status: ON HOLD | COMMUNITY

## 2022-02-03 RX ORDER — FAMOTIDINE 20 MG/1
1 TABLET AT BEDTIME AS NEEDED TABLET, FILM COATED ORAL ONCE A DAY
Qty: 30 | Refills: 1 | Status: DISCONTINUED | COMMUNITY
Start: 2021-08-05

## 2022-02-03 RX ORDER — B-COMPLEX WITH VITAMIN C
AS DIRECTED TABLET ORAL
Status: ON HOLD | COMMUNITY

## 2022-02-03 RX ORDER — FAMOTIDINE 40 MG/1
1 TABLET AT BEDTIME AS NEEDED TABLET, FILM COATED ORAL ONCE A DAY
Qty: 90 TABLET | Refills: 1 | OUTPATIENT

## 2022-02-03 RX ORDER — FLUCONAZOLE 100 MG/1
1 TABLET TABLET ORAL DAILY
Qty: 11 | Status: ON HOLD | COMMUNITY
Start: 2021-04-23

## 2022-02-03 RX ORDER — IPRATROPIUM BROMIDE 21 UG/1
SPRAY, METERED NASAL
Qty: 30 UNSPECIFIED | Status: DISCONTINUED | COMMUNITY

## 2022-02-03 NOTE — HPI-CHANGE IN BOWEL HABITS
Patient admits her bowel habits have returned to normal. Currently, reports 1-2 formed bowel movements  per day without strain.  Denies melena,  blood, or mucus in stools rectal pain or pruritus ani at this time.     Last visit (8/5/2021)    Patient reports that her stools have not returned to normal at this time.    Currently, admits 2 bowel movements per day. Her stools alternate between formed and loose pieces. Denies any melena, blood or mucus in stools. Denies rectal pain.   Patient has been placed on a 5-year surveillance for a colonoscopy. She is due 4/2026.

## 2022-02-03 NOTE — HPI-EARLY SATIETY
Patient admits improvement  with eating small portion meals..    Last visit (8/5/2021)  Patient reports that she will eat smaller portions and states that she has made some dietary modifications with minor improvement

## 2022-02-03 NOTE — HPI-GAS
She admits continued episodes of belching.  Admits improvement when chewing her food well.   Last visit (8/5/2021)  Patient reports night time symptoms of belching. She admits that she has to hit her chest with her fist and she will start to belch and the fullness and discomfort will subside.

## 2022-02-03 NOTE — HPI-EPIGASTRIC PAIN
Patient presents today for a 6 month follow up of epigastric pain.   She admits daily episodes of chect pain discomfort with associated "trapped gas" retrosternally. She report less frequent when she is up moving around.  More noticable when driving.  Its relieved by eructation   She has been treated with Omeprazole 40 mg  QD by ENT, over the past 3 months.  Last visit (8/5/2021)  Patient presents today after her EGD and follow up of epigastric pain. She denies any complications after her procedure. She continue to experience a globus sensation daily. Since the procedure patient denies dysphagia, any changes in her appetite or bowel habits.         Patient reports occasional epigastic discomfort since her EGD that will resolve after a bowel movement.

## 2022-02-03 NOTE — HPI-GLOBUS
She admits marked improvement since she has been taking Omeprazole 40 mg  QD over the past 3 months, prescribed by ENT.   Last visit (8/5/2021)  She continues to experience a globus sensation daily. Since the procedure patient denies dysphagia, any changes in her appetite or bowel habits.

## 2022-04-29 ENCOUNTER — LAB SERVICES (OUTPATIENT)
Dept: LAB | Age: 77
End: 2022-04-29

## 2022-04-29 ENCOUNTER — APPOINTMENT (OUTPATIENT)
Dept: LAB | Age: 77
End: 2022-04-29
Attending: INTERNAL MEDICINE

## 2022-04-29 LAB
ALBUMIN SERPL-MCNC: 3.3 G/DL (ref 3.6–5.1)
ALBUMIN/GLOB SERPL: 0.9 {RATIO} (ref 1–2.4)
ALP SERPL-CCNC: 193 UNITS/L (ref 45–117)
ALT SERPL-CCNC: 39 UNITS/L
ANION GAP SERPL CALC-SCNC: 10 MMOL/L (ref 10–20)
AST SERPL-CCNC: 40 UNITS/L
BILIRUB SERPL-MCNC: 0.5 MG/DL (ref 0.2–1)
BUN SERPL-MCNC: 16 MG/DL (ref 6–20)
BUN/CREAT SERPL: 14 (ref 7–25)
CALCIUM SERPL-MCNC: 9.1 MG/DL (ref 8.4–10.2)
CHLORIDE SERPL-SCNC: 107 MMOL/L (ref 98–107)
CO2 SERPL-SCNC: 25 MMOL/L (ref 21–32)
CREAT SERPL-MCNC: 1.11 MG/DL (ref 0.51–0.95)
FASTING DURATION TIME PATIENT: 10 HOURS (ref 0–999)
GFR SERPLBLD BASED ON 1.73 SQ M-ARVRAT: 52 ML/MIN
GLOBULIN SER-MCNC: 3.8 G/DL (ref 2–4)
GLUCOSE SERPL-MCNC: 106 MG/DL (ref 70–99)
POTASSIUM SERPL-SCNC: 4.2 MMOL/L (ref 3.4–5.1)
PROT SERPL-MCNC: 7.1 G/DL (ref 6.4–8.2)
RAINBOW EXTRA TUBES HOLD SPECIMEN: NORMAL
SODIUM SERPL-SCNC: 138 MMOL/L (ref 135–145)

## 2022-04-29 PROCEDURE — 36415 COLL VENOUS BLD VENIPUNCTURE: CPT | Performed by: CLINICAL MEDICAL LABORATORY

## 2022-04-29 PROCEDURE — 80053 COMPREHEN METABOLIC PANEL: CPT | Performed by: CLINICAL MEDICAL LABORATORY

## 2022-05-03 ENCOUNTER — LAB REQUISITION (OUTPATIENT)
Dept: LAB | Age: 77
End: 2022-05-03

## 2022-05-03 DIAGNOSIS — N18.9 CHRONIC KIDNEY DISEASE, UNSPECIFIED: ICD-10-CM

## 2022-05-03 DIAGNOSIS — R73.03 PREDIABETES: ICD-10-CM

## 2022-05-03 DIAGNOSIS — R53.83 OTHER FATIGUE: ICD-10-CM

## 2022-05-03 DIAGNOSIS — E55.9 VITAMIN D DEFICIENCY, UNSPECIFIED: ICD-10-CM

## 2022-05-03 DIAGNOSIS — J45.909 UNSPECIFIED ASTHMA, UNCOMPLICATED: ICD-10-CM

## 2022-05-03 DIAGNOSIS — E78.5 HYPERLIPIDEMIA, UNSPECIFIED: ICD-10-CM

## 2022-05-05 ENCOUNTER — CLAIMS CREATED FROM THE CLAIM WINDOW (OUTPATIENT)
Dept: URBAN - METROPOLITAN AREA CLINIC 33 | Facility: CLINIC | Age: 77
End: 2022-05-05
Payer: MEDICARE

## 2022-05-05 VITALS
HEIGHT: 60 IN | DIASTOLIC BLOOD PRESSURE: 74 MMHG | HEART RATE: 74 BPM | WEIGHT: 102 LBS | BODY MASS INDEX: 20.03 KG/M2 | SYSTOLIC BLOOD PRESSURE: 124 MMHG | OXYGEN SATURATION: 98 %

## 2022-05-05 DIAGNOSIS — R07.89 OTHER CHEST PAIN: ICD-10-CM

## 2022-05-05 DIAGNOSIS — R14.2 ERUCTATION: ICD-10-CM

## 2022-05-05 DIAGNOSIS — K29.30 CHRONIC SUPERFICIAL GASTRITIS WITHOUT BLEEDING: ICD-10-CM

## 2022-05-05 DIAGNOSIS — K31.89 INTESTINAL METAPLASIA OF GASTRIC MUCOSA: ICD-10-CM

## 2022-05-05 DIAGNOSIS — K21.0 GASTRO-ESOPHAGEAL REFLUX DISEASE WITH ESOPHAGITIS: ICD-10-CM

## 2022-05-05 DIAGNOSIS — R19.4 CHANGE IN BOWEL HABITS: ICD-10-CM

## 2022-05-05 DIAGNOSIS — K44.9 DIAPHRAGMATIC HERNIA WITHOUT OBSTRUCTION OR GANGRENE: ICD-10-CM

## 2022-05-05 PROCEDURE — 99214 OFFICE O/P EST MOD 30 MIN: CPT | Performed by: INTERNAL MEDICINE

## 2022-05-05 RX ORDER — OMEPRAZOLE 40 MG/1
1 CAPSULE 30 MINUTES BEFORE MORNING MEAL CAPSULE, DELAYED RELEASE ORAL ONCE A DAY
Status: ON HOLD | COMMUNITY

## 2022-05-05 RX ORDER — ZOLPIDEM TARTRATE 10 MG/1
1 TABLET AT BEDTIME TABLET, FILM COATED ORAL ONCE A DAY
Status: ACTIVE | COMMUNITY

## 2022-05-05 RX ORDER — AMOXICILLIN 500 MG
AS DIRECTED CAPSULE ORAL
Status: ON HOLD | COMMUNITY

## 2022-05-05 RX ORDER — FLUCONAZOLE 100 MG/1
1 TABLET TABLET ORAL DAILY
Qty: 11 | Status: ON HOLD | COMMUNITY
Start: 2021-04-23

## 2022-05-05 RX ORDER — B-COMPLEX WITH VITAMIN C
AS DIRECTED TABLET ORAL
Status: ON HOLD | COMMUNITY

## 2022-05-05 RX ORDER — B-COMPLEX WITH VITAMIN C
AS DIRECTED TABLET ORAL
Status: ACTIVE | COMMUNITY

## 2022-05-05 RX ORDER — FAMOTIDINE 40 MG/1
1 TABLET AT BEDTIME AS NEEDED TABLET, FILM COATED ORAL ONCE A DAY
Qty: 90 TABLET | Refills: 1 | Status: ACTIVE | COMMUNITY

## 2022-05-05 RX ORDER — MAGNESIUM OXIDE/MAG AA CHELATE 300 MG
1 CAPSULE WITH A MEAL CAPSULE ORAL ONCE A DAY
Status: ON HOLD | COMMUNITY

## 2022-05-05 RX ORDER — UBIDECARENONE/VIT E ACET 100MG-5
3 CAPSULES CAPSULE ORAL ONCE A DAY
Status: ACTIVE | COMMUNITY

## 2022-05-05 RX ORDER — FAMOTIDINE 40 MG/1
1 TABLET AT BEDTIME AS NEEDED TABLET, FILM COATED ORAL ONCE A DAY
Qty: 90 TABLET | Refills: 1 | OUTPATIENT

## 2022-05-05 NOTE — HPI-GLOBUS
She admits improvement in symptoms since seeing an ENT she was given a medication that she can't remember the name of but it helps has resolved her symptoms.   Last visit (2/3/2022)  She admits marked improvement since she has been taking Omeprazole 40 mg  QD over the past 3 months, prescribed by ENT.   Last visit (8/5/2021)  She continues to experience a globus sensation daily. Since the procedure patient denies dysphagia, any changes in her appetite or bowel habits.

## 2022-05-05 NOTE — HPI-GAS
Patient reports she still experience gas and she has taken 2 different types of Irish digestive medication that has helped but reports that symptoms remain.   She admits that gas is passable via belching. Patient reports that her belching is excessive.    Last visit (2/3/2022)  She admits continued episodes of belching.  Admits improvement when chewing her food well.   Last visit (8/5/2021)  Patient reports night time symptoms of belching. She admits that she has to hit her chest with her fist and she will start to belch and the fullness and discomfort will subside.

## 2022-05-05 NOTE — HPI-CHANGE IN BOWEL HABITS
She reports that her bowel habits are returning to normal. Her stools are based on what she eats. Currently reports 1-2 bowel movements a day with no strain. Her stools are formed without blood or mucus present.   Patient denies any rectal pain or pruritus ani.    Last visit (2/3/2022)  Patient admits her bowel habits have returned to normal. Currently, reports 1-2 formed bowel movements  per day without strain.  Denies melena,  blood, or mucus in stools rectal pain or pruritus ani at this time.     Last visit (8/5/2021)    Patient reports that her stools have not returned to normal at this time.    Currently, admits 2 bowel movements per day. Her stools alternate between formed and loose pieces. Denies any melena, blood or mucus in stools. Denies rectal pain.   Patient has been placed on a 5-year surveillance for a colonoscopy. She is due 4/2026.

## 2022-05-05 NOTE — HPI-EPIGASTRIC PAIN
Patient presents today for a follow up of epigastic pain. She admits continued epigastric pain she reports some foods are aggravating factors. She states that she has traveled to see her sister and the food and time was different and symptoms are still present.   She reports that she hasn't taken Dicyclomine at this time for abdominal pain. Patient thought that this was to regulate her stools and had not taken it.    Last visit (2/3/2022)  Patient presents today for a 6 month follow up of epigastric pain.   She admits daily episodes of chect pain discomfort with associated "trapped gas" retrosternally. She report less frequent when she is up moving around.  More noticable when driving.  Its relieved by eructation   She has been treated with Omeprazole 40 mg  QD by ENT, over the past 3 months.  Last visit (8/5/2021)  Patient presents today after her EGD and follow up of epigastric pain. She denies any complications after her procedure. She continue to experience a globus sensation daily. Since the procedure patient denies dysphagia, any changes in her appetite or bowel habits.         Patient reports occasional epigastic discomfort since her EGD that will resolve after a bowel movement.

## 2022-05-05 NOTE — HPI-EARLY SATIETY
She reports symptoms of early satiety has resolved.   Last visit (2/3/2022)  Patient admits improvement  with eating small portion meals..    Last visit (8/5/2021)  Patient reports that she will eat smaller portions and states that she has made some dietary modifications with minor improvement

## 2022-08-13 ENCOUNTER — LAB SERVICES (OUTPATIENT)
Dept: LAB | Age: 77
End: 2022-08-13

## 2022-08-13 LAB
25(OH)D3+25(OH)D2 SERPL-MCNC: 37 NG/ML (ref 30–100)
ALBUMIN SERPL-MCNC: 3.4 G/DL (ref 3.6–5.1)
ALBUMIN/GLOB SERPL: 1.2 {RATIO} (ref 1–2.4)
ALP SERPL-CCNC: 100 UNITS/L (ref 45–117)
ALT SERPL-CCNC: 19 UNITS/L
ANION GAP SERPL CALC-SCNC: 9 MMOL/L (ref 7–19)
AST SERPL-CCNC: 17 UNITS/L
BASOPHILS # BLD: 0 K/MCL (ref 0–0.3)
BASOPHILS NFR BLD: 1 %
BILIRUB SERPL-MCNC: 0.5 MG/DL (ref 0.2–1)
BUN SERPL-MCNC: 21 MG/DL (ref 6–20)
BUN/CREAT SERPL: 18 (ref 7–25)
CALCIUM SERPL-MCNC: 8.7 MG/DL (ref 8.4–10.2)
CHLORIDE SERPL-SCNC: 113 MMOL/L (ref 97–110)
CHOLEST SERPL-MCNC: 154 MG/DL
CHOLEST/HDLC SERPL: 2.2 {RATIO}
CO2 SERPL-SCNC: 23 MMOL/L (ref 21–32)
CREAT SERPL-MCNC: 1.19 MG/DL (ref 0.51–0.95)
DEPRECATED RDW RBC: 49.1 FL (ref 39–50)
EOSINOPHIL # BLD: 0.4 K/MCL (ref 0–0.5)
EOSINOPHIL NFR BLD: 5 %
ERYTHROCYTE [DISTWIDTH] IN BLOOD: 13.2 % (ref 11–15)
FASTING DURATION TIME PATIENT: 10 HOURS (ref 0–999)
FASTING DURATION TIME PATIENT: 10 HOURS (ref 0–999)
GFR SERPLBLD BASED ON 1.73 SQ M-ARVRAT: 48 ML/MIN
GLOBULIN SER-MCNC: 2.8 G/DL (ref 2–4)
GLUCOSE SERPL-MCNC: 105 MG/DL (ref 70–99)
HBA1C MFR BLD: 5.8 % (ref 4.5–5.6)
HCT VFR BLD CALC: 39.6 % (ref 36–46.5)
HDLC SERPL-MCNC: 70 MG/DL
HGB BLD-MCNC: 13.3 G/DL (ref 12–15.5)
IMM GRANULOCYTES # BLD AUTO: 0 K/MCL (ref 0–0.2)
IMM GRANULOCYTES # BLD: 0 %
LDLC SERPL CALC-MCNC: 62 MG/DL
LYMPHOCYTES # BLD: 3.1 K/MCL (ref 1–4)
LYMPHOCYTES NFR BLD: 42 %
MCH RBC QN AUTO: 33.8 PG (ref 26–34)
MCHC RBC AUTO-ENTMCNC: 33.6 G/DL (ref 32–36.5)
MCV RBC AUTO: 100.5 FL (ref 78–100)
MONOCYTES # BLD: 0.6 K/MCL (ref 0.3–0.9)
MONOCYTES NFR BLD: 8 %
NEUTROPHILS # BLD: 3.3 K/MCL (ref 1.8–7.7)
NEUTROPHILS NFR BLD: 44 %
NONHDLC SERPL-MCNC: 84 MG/DL
NRBC BLD MANUAL-RTO: 0 /100 WBC
PLATELET # BLD AUTO: 158 K/MCL (ref 140–450)
POTASSIUM SERPL-SCNC: 4.4 MMOL/L (ref 3.4–5.1)
PROT SERPL-MCNC: 6.2 G/DL (ref 6.4–8.2)
RBC # BLD: 3.94 MIL/MCL (ref 4–5.2)
SODIUM SERPL-SCNC: 141 MMOL/L (ref 135–145)
T4 FREE SERPL-MCNC: 1.1 NG/DL (ref 0.8–1.5)
TRIGL SERPL-MCNC: 111 MG/DL
TSH SERPL-ACNC: 3.72 MCUNITS/ML (ref 0.35–5)
VIT B12 SERPL-MCNC: 359 PG/ML (ref 211–911)
WBC # BLD: 7.5 K/MCL (ref 4.2–11)

## 2022-08-13 PROCEDURE — 84439 ASSAY OF FREE THYROXINE: CPT | Performed by: CLINICAL MEDICAL LABORATORY

## 2022-08-13 PROCEDURE — 83036 HEMOGLOBIN GLYCOSYLATED A1C: CPT | Performed by: CLINICAL MEDICAL LABORATORY

## 2022-08-13 PROCEDURE — 82306 VITAMIN D 25 HYDROXY: CPT | Performed by: CLINICAL MEDICAL LABORATORY

## 2022-08-13 PROCEDURE — 85025 COMPLETE CBC W/AUTO DIFF WBC: CPT | Performed by: CLINICAL MEDICAL LABORATORY

## 2022-08-13 PROCEDURE — 84443 ASSAY THYROID STIM HORMONE: CPT | Performed by: CLINICAL MEDICAL LABORATORY

## 2022-08-13 PROCEDURE — 82607 VITAMIN B-12: CPT | Performed by: CLINICAL MEDICAL LABORATORY

## 2022-08-13 PROCEDURE — 80053 COMPREHEN METABOLIC PANEL: CPT | Performed by: CLINICAL MEDICAL LABORATORY

## 2022-08-13 PROCEDURE — 80061 LIPID PANEL: CPT | Performed by: CLINICAL MEDICAL LABORATORY

## 2022-08-13 PROCEDURE — 36415 COLL VENOUS BLD VENIPUNCTURE: CPT | Performed by: CLINICAL MEDICAL LABORATORY

## 2022-09-26 ENCOUNTER — HOSPITAL ENCOUNTER (OUTPATIENT)
Dept: MAMMOGRAPHY | Age: 77
Discharge: HOME | End: 2022-09-26
Attending: INTERNAL MEDICINE

## 2022-09-26 DIAGNOSIS — Z12.31 ENCOUNTER FOR SCREENING MAMMOGRAM FOR MALIGNANT NEOPLASM OF BREAST: ICD-10-CM

## 2022-09-26 PROCEDURE — 77063 BREAST TOMOSYNTHESIS BI: CPT

## 2022-11-08 ENCOUNTER — OFFICE VISIT (OUTPATIENT)
Dept: URBAN - METROPOLITAN AREA CLINIC 35 | Facility: CLINIC | Age: 77
End: 2022-11-08

## 2022-11-21 ENCOUNTER — OFFICE VISIT (OUTPATIENT)
Dept: URBAN - METROPOLITAN AREA CLINIC 33 | Facility: CLINIC | Age: 77
End: 2022-11-21
Payer: MEDICARE

## 2022-11-21 VITALS
DIASTOLIC BLOOD PRESSURE: 68 MMHG | HEIGHT: 60 IN | WEIGHT: 104 LBS | SYSTOLIC BLOOD PRESSURE: 104 MMHG | OXYGEN SATURATION: 98 % | HEART RATE: 68 BPM | BODY MASS INDEX: 20.42 KG/M2

## 2022-11-21 DIAGNOSIS — K29.30 CHRONIC SUPERFICIAL GASTRITIS WITHOUT BLEEDING: ICD-10-CM

## 2022-11-21 DIAGNOSIS — R07.89 OTHER CHEST PAIN: ICD-10-CM

## 2022-11-21 DIAGNOSIS — K31.89 INTESTINAL METAPLASIA OF GASTRIC MUCOSA: ICD-10-CM

## 2022-11-21 DIAGNOSIS — K21.00 ALKALINE REFLUX ESOPHAGITIS: ICD-10-CM

## 2022-11-21 PROBLEM — 39839004 DIAPHRAGMATIC HERNIA: Status: ACTIVE | Noted: 2021-05-06

## 2022-11-21 PROBLEM — 72519002: Status: ACTIVE | Noted: 2021-05-06

## 2022-11-21 PROBLEM — 196735001 CSG - CHRONIC SUPERFICIAL GASTRITIS: Status: ACTIVE | Noted: 2021-05-06

## 2022-11-21 PROBLEM — 266433003 GASTRO-ESOPHAGEAL REFLUX DISEASE WITH ESOPHAGITIS: Status: ACTIVE | Noted: 2021-05-06

## 2022-11-21 PROBLEM — 129851009: Status: ACTIVE | Noted: 2019-07-18

## 2022-11-21 PROBLEM — 29857009: Status: ACTIVE | Noted: 2022-02-03

## 2022-11-21 PROBLEM — 271834000 ERUCTATION: Status: ACTIVE | Noted: 2018-04-19

## 2022-11-21 PROBLEM — 249504006: Status: ACTIVE | Noted: 2022-11-21

## 2022-11-21 PROCEDURE — 99214 OFFICE O/P EST MOD 30 MIN: CPT | Performed by: INTERNAL MEDICINE

## 2022-11-21 RX ORDER — FAMOTIDINE 20 MG/1
1 TABLET AM AND  AT BEDTIME TABLET, FILM COATED ORAL TWICE A DAY
Qty: 180 | Refills: 1 | OUTPATIENT
Start: 2022-11-21

## 2022-11-21 RX ORDER — FLUCONAZOLE 100 MG/1
1 TABLET TABLET ORAL DAILY
Qty: 11 | Status: ON HOLD | COMMUNITY
Start: 2021-04-23

## 2022-11-21 RX ORDER — FAMOTIDINE 20 MG/1
1 TABLET AT BEDTIME AS NEEDED TABLET, FILM COATED ORAL ONCE A DAY
Qty: 90 TABLET | Refills: 1 | Status: ACTIVE | COMMUNITY

## 2022-11-21 RX ORDER — B-COMPLEX WITH VITAMIN C
AS DIRECTED TABLET ORAL
Status: ON HOLD | COMMUNITY

## 2022-11-21 RX ORDER — OMEPRAZOLE 40 MG/1
1 CAPSULE 30 MINUTES BEFORE MORNING MEAL CAPSULE, DELAYED RELEASE ORAL ONCE A DAY
Status: ON HOLD | COMMUNITY

## 2022-11-21 RX ORDER — B-COMPLEX WITH VITAMIN C
AS DIRECTED TABLET ORAL
Status: ACTIVE | COMMUNITY

## 2022-11-21 RX ORDER — UBIDECARENONE/VIT E ACET 100MG-5
3 CAPSULES CAPSULE ORAL ONCE A DAY
Status: ACTIVE | COMMUNITY

## 2022-11-21 RX ORDER — MAGNESIUM OXIDE/MAG AA CHELATE 300 MG
1 CAPSULE WITH A MEAL CAPSULE ORAL ONCE A DAY
Status: ON HOLD | COMMUNITY

## 2022-11-21 RX ORDER — ZOLPIDEM TARTRATE 10 MG/1
1 TABLET AT BEDTIME TABLET, FILM COATED ORAL ONCE A DAY
Status: ACTIVE | COMMUNITY

## 2022-11-21 RX ORDER — AMOXICILLIN 500 MG
AS DIRECTED CAPSULE ORAL
Status: ON HOLD | COMMUNITY

## 2022-11-21 NOTE — HPI-GLOBUS
Patient continues to admits improvement with her globus sensation  (Last Visit 05.05.2022) She admits improvement in symptoms since seeing an ENT she was given a medication that she can't remember the name of but it helps has resolved her symptoms.

## 2022-11-21 NOTE — HPI-EARLY SATIETY
She continues to denies any recent episodes of early satiety.  (Last Visit 05.05.2022) She reports symptoms of early satiety has resolved.

## 2022-11-21 NOTE — HPI-GAS
She admits mild improvement with her gas symptoms. Patient states she continues to take an Korean digestive supplement when she is feeling uncomfortable and it will provide relief of symptoms. She admits symptoms are intermittent.   She reports that some times even when she drinks water she will experience belching episodes.   (Last Visit 05.05.2022) Patient reports she still experience gas and she has taken 2 different types of French digestive medication that has helped but reports that symptoms remain.   She admits that gas is passable via belching. Patient reports that her belching is excessive.

## 2022-11-21 NOTE — HPI-CHANGE IN BOWEL HABITS
She denies improvement with her bowel habits. She reports that some times she doesn't feel that she is completly emptying her bowels.   Currently, having 2-3 bowel movement a day with a strenous bowel movement once a week Stools are normally soft without the presence of blood, mucus, and melena.   Patient denies any rectal pain at this time. She admits abdominal cramps that will disipate after a bowel movemennt.   (Last Visit 05.05.2022) She reports that her bowel habits are returning to normal. Her stools are based on what she eats. Currently reports 1-2 bowel movements a day with no strain. Her stools are formed without blood or mucus present.   Patient denies any rectal pain or pruritus ani.

## 2022-11-21 NOTE — HPI-EPIGASTRIC PAIN
Patient present today for 6 month follow-up. She admits any improvement since last visit. She continues the use of Famotidine 40mg with relief of symptoms. Symptoms will last for moments.   (Last Visit 05.05.2022)  Patient presents today for a follow up of epigastic pain. She admits continued epigastric pain she reports some foods are aggravating factors. She states that she has traveled to see her sister and the food and time was different and symptoms are still present.   She reports that she hasn't taken Dicyclomine at this time for abdominal pain. Patient thought that this was to regulate her stools and had not taken it.

## 2022-11-29 ENCOUNTER — OFFICE VISIT (OUTPATIENT)
Dept: URBAN - METROPOLITAN AREA CLINIC 35 | Facility: CLINIC | Age: 77
End: 2022-11-29

## 2022-12-06 ENCOUNTER — OFFICE VISIT (OUTPATIENT)
Dept: ORTHOPEDICS | Age: 77
End: 2022-12-06

## 2022-12-06 ENCOUNTER — IMAGING SERVICES (OUTPATIENT)
Dept: GENERAL RADIOLOGY | Age: 77
End: 2022-12-06
Attending: ORTHOPAEDIC SURGERY

## 2022-12-06 VITALS — HEIGHT: 64 IN | WEIGHT: 167.55 LBS | BODY MASS INDEX: 28.6 KG/M2

## 2022-12-06 DIAGNOSIS — M17.0 BILATERAL PRIMARY OSTEOARTHRITIS OF KNEE: Primary | ICD-10-CM

## 2022-12-06 DIAGNOSIS — M17.12 PRIMARY OSTEOARTHRITIS OF LEFT KNEE: ICD-10-CM

## 2022-12-06 PROCEDURE — 99203 OFFICE O/P NEW LOW 30 MIN: CPT | Performed by: ORTHOPAEDIC SURGERY

## 2022-12-06 PROCEDURE — 73564 X-RAY EXAM KNEE 4 OR MORE: CPT | Performed by: ORTHOPAEDIC SURGERY

## 2022-12-06 PROCEDURE — 20610 DRAIN/INJ JOINT/BURSA W/O US: CPT | Performed by: ORTHOPAEDIC SURGERY

## 2022-12-06 RX ORDER — PRAVASTATIN SODIUM 40 MG
40 TABLET ORAL AT BEDTIME
COMMUNITY
Start: 2022-10-03

## 2022-12-06 RX ORDER — ICOSAPENT ETHYL 500 MG/1
CAPSULE ORAL
COMMUNITY

## 2022-12-06 RX ORDER — METOPROLOL SUCCINATE 25 MG/1
25 TABLET, EXTENDED RELEASE ORAL DAILY
COMMUNITY
Start: 2022-11-17

## 2022-12-06 RX ORDER — ASCORBIC ACID
CRYSTALS ORAL
COMMUNITY

## 2022-12-06 RX ORDER — LIDOCAINE HYDROCHLORIDE 10 MG/ML
5 INJECTION, SOLUTION INFILTRATION; PERINEURAL
Status: COMPLETED | OUTPATIENT
Start: 2022-12-06 | End: 2022-12-06

## 2022-12-06 RX ORDER — FLUTICASONE PROPIONATE AND SALMETEROL 50; 250 UG/1; UG/1
1 POWDER RESPIRATORY (INHALATION) 2 TIMES DAILY
COMMUNITY
Start: 2022-10-20

## 2022-12-06 RX ORDER — BETAMETHASONE SODIUM PHOSPHATE AND BETAMETHASONE ACETATE 3; 3 MG/ML; MG/ML
6 INJECTION, SUSPENSION INTRA-ARTICULAR; INTRALESIONAL; INTRAMUSCULAR; SOFT TISSUE
Status: COMPLETED | OUTPATIENT
Start: 2022-12-06 | End: 2022-12-06

## 2022-12-06 RX ORDER — PANTOPRAZOLE SODIUM 40 MG/1
TABLET, DELAYED RELEASE ORAL
COMMUNITY
Start: 2022-09-22

## 2022-12-06 RX ADMIN — BETAMETHASONE SODIUM PHOSPHATE AND BETAMETHASONE ACETATE 6 MG: 3; 3 INJECTION, SUSPENSION INTRA-ARTICULAR; INTRALESIONAL; INTRAMUSCULAR; SOFT TISSUE at 09:41

## 2022-12-06 RX ADMIN — LIDOCAINE HYDROCHLORIDE 5 ML: 10 INJECTION, SOLUTION INFILTRATION; PERINEURAL at 09:41

## 2022-12-16 ENCOUNTER — TELEPHONE ENCOUNTER (OUTPATIENT)
Dept: URBAN - METROPOLITAN AREA CLINIC 33 | Facility: CLINIC | Age: 77
End: 2022-12-16

## 2022-12-16 RX ORDER — ZOLPIDEM TARTRATE 10 MG/1
1 TABLET AT BEDTIME TABLET, FILM COATED ORAL ONCE A DAY
Status: ACTIVE | COMMUNITY

## 2022-12-16 RX ORDER — FLUCONAZOLE 100 MG/1
1 TABLET TABLET ORAL DAILY
Qty: 11 | Status: ON HOLD | COMMUNITY
Start: 2021-04-23

## 2022-12-16 RX ORDER — AMOXICILLIN AND CLAVULANATE POTASSIUM 875; 125 MG/1; MG/1
1 TABLET TABLET, FILM COATED ORAL
Qty: 20 | Refills: 0 | OUTPATIENT
Start: 2022-12-16 | End: 2022-12-26

## 2022-12-16 RX ORDER — AMOXICILLIN 500 MG
AS DIRECTED CAPSULE ORAL
Status: ON HOLD | COMMUNITY

## 2022-12-16 RX ORDER — FAMOTIDINE 20 MG/1
1 TABLET AT BEDTIME AS NEEDED TABLET, FILM COATED ORAL ONCE A DAY
Qty: 90 TABLET | Refills: 1 | Status: ACTIVE | COMMUNITY

## 2022-12-16 RX ORDER — B-COMPLEX WITH VITAMIN C
AS DIRECTED TABLET ORAL
Status: ACTIVE | COMMUNITY

## 2022-12-16 RX ORDER — B-COMPLEX WITH VITAMIN C
AS DIRECTED TABLET ORAL
Status: ON HOLD | COMMUNITY

## 2022-12-16 RX ORDER — MAGNESIUM OXIDE/MAG AA CHELATE 300 MG
1 CAPSULE WITH A MEAL CAPSULE ORAL ONCE A DAY
Status: ON HOLD | COMMUNITY

## 2022-12-16 RX ORDER — UBIDECARENONE/VIT E ACET 100MG-5
3 CAPSULES CAPSULE ORAL ONCE A DAY
Status: ACTIVE | COMMUNITY

## 2022-12-16 RX ORDER — OMEPRAZOLE 40 MG/1
1 CAPSULE 30 MINUTES BEFORE MORNING MEAL CAPSULE, DELAYED RELEASE ORAL ONCE A DAY
Status: ON HOLD | COMMUNITY

## 2022-12-16 RX ORDER — FAMOTIDINE 20 MG/1
1 TABLET AM AND  AT BEDTIME TABLET, FILM COATED ORAL TWICE A DAY
Qty: 180 | Refills: 1 | Status: ACTIVE | COMMUNITY
Start: 2022-11-21

## 2023-01-17 ENCOUNTER — APPOINTMENT (OUTPATIENT)
Dept: ORTHOPEDICS | Age: 78
End: 2023-01-17

## 2023-01-19 ENCOUNTER — OFFICE VISIT (OUTPATIENT)
Dept: URBAN - METROPOLITAN AREA CLINIC 33 | Facility: CLINIC | Age: 78
End: 2023-01-19
Payer: MEDICARE

## 2023-01-19 ENCOUNTER — DASHBOARD ENCOUNTERS (OUTPATIENT)
Age: 78
End: 2023-01-19

## 2023-01-19 VITALS
DIASTOLIC BLOOD PRESSURE: 58 MMHG | BODY MASS INDEX: 20.42 KG/M2 | WEIGHT: 104 LBS | HEART RATE: 73 BPM | HEIGHT: 60 IN | OXYGEN SATURATION: 96 % | SYSTOLIC BLOOD PRESSURE: 104 MMHG

## 2023-01-19 DIAGNOSIS — K82.4 GALL BLADDER POLYP: ICD-10-CM

## 2023-01-19 DIAGNOSIS — D12.3 BENIGN NEOPLASM OF TRANSVERSE COLON: ICD-10-CM

## 2023-01-19 DIAGNOSIS — K63.89 SMALL INTESTINAL BACTERIAL OVERGROWTH (SIBO): ICD-10-CM

## 2023-01-19 PROCEDURE — 99214 OFFICE O/P EST MOD 30 MIN: CPT | Performed by: INTERNAL MEDICINE

## 2023-01-19 RX ORDER — B-COMPLEX WITH VITAMIN C
AS DIRECTED TABLET ORAL
Status: ON HOLD | COMMUNITY

## 2023-01-19 RX ORDER — B-COMPLEX WITH VITAMIN C
AS DIRECTED TABLET ORAL
Status: ACTIVE | COMMUNITY

## 2023-01-19 RX ORDER — FLUCONAZOLE 100 MG/1
1 TABLET TABLET ORAL DAILY
Qty: 11 | Status: ON HOLD | COMMUNITY
Start: 2021-04-23

## 2023-01-19 RX ORDER — ZOLPIDEM TARTRATE 10 MG/1
1 TABLET AT BEDTIME TABLET, FILM COATED ORAL ONCE A DAY
Status: ACTIVE | COMMUNITY

## 2023-01-19 RX ORDER — UBIDECARENONE/VIT E ACET 100MG-5
3 CAPSULES CAPSULE ORAL ONCE A DAY
Status: ACTIVE | COMMUNITY

## 2023-01-19 RX ORDER — FAMOTIDINE 20 MG/1
1 TABLET AT BEDTIME AS NEEDED TABLET, FILM COATED ORAL ONCE A DAY
Qty: 90 TABLET | Refills: 1 | Status: ON HOLD | COMMUNITY

## 2023-01-19 RX ORDER — AMOXICILLIN 500 MG
AS DIRECTED CAPSULE ORAL
Status: ON HOLD | COMMUNITY

## 2023-01-19 RX ORDER — MAGNESIUM OXIDE/MAG AA CHELATE 300 MG
1 CAPSULE WITH A MEAL CAPSULE ORAL ONCE A DAY
Status: ON HOLD | COMMUNITY

## 2023-01-19 RX ORDER — OMEPRAZOLE 40 MG/1
1 CAPSULE 30 MINUTES BEFORE MORNING MEAL CAPSULE, DELAYED RELEASE ORAL ONCE A DAY
Status: ON HOLD | COMMUNITY

## 2023-01-19 NOTE — HPI-GLOBUS
She denies a continued globus sensation at this time.   Last visit (11/21/2022)  Patient continues to admits improvement with her globus sensation

## 2023-01-19 NOTE — HPI-CHANGE IN BOWEL HABITS
She admits that her bowel habits have returned to normal since her last visit.   Currently she reports 2 bowel movements day with strain. Her stools are formed without blood, mucus, or melena. She denies rectal pain or pruritus ani.   Last visit (11/21/2022)  She denies improvement with her bowel habits. She reports that some times she doesn't feel that she is completly emptying her bowels.   Currently, having 2-3 bowel movement a day with a strenous bowel movement once a week Stools are normally soft without the presence of blood, mucus, and melena.   Patient denies any rectal pain at this time. She admits abdominal cramps that will disipate after a bowel movemennt.

## 2023-01-19 NOTE — HPI-EPIGASTRIC PAIN
Patient presents today for a follow up of Epigastric Pain. She denies continued pain since her last visit. She reports occasionally post prandial she have an episode due to eating to fast.   She admits completing the course of Augmentin 875-125 mg 1 BID x 10 days for SIBO.   Last visit (11/21/2022)  Patient present today for 6 month follow-up. She admits any improvement since last visit. She continues the use of Famotidine 40mg with relief of symptoms. Symptoms will last for moments.

## 2023-01-19 NOTE — HPI-EARLY SATIETY
Patient admit sometimes after eating half of her meal she feel symptoms of early satiety.   Last visit (11/21/2022)  She continues to denies any recent episodes of early satiety.

## 2023-01-19 NOTE — HPI-GAS
She admits continued gas but not as often and symptoms are mild. Patient reports she does not take anything for her symptoms    Last visit (11/21/2022)  She admits mild improvement with her gas symptoms. Patient states she continues to take an Korean digestive supplement when she is feeling uncomfortable and it will provide relief of symptoms. She admits symptoms are intermittent.   She reports that some times even when she drinks water she will experience belching episodes.

## 2023-02-10 ENCOUNTER — TELEPHONE ENCOUNTER (OUTPATIENT)
Dept: URBAN - METROPOLITAN AREA CLINIC 33 | Facility: CLINIC | Age: 78
End: 2023-02-10

## 2023-02-28 ENCOUNTER — LAB REQUISITION (OUTPATIENT)
Dept: LAB | Age: 78
End: 2023-02-28

## 2023-02-28 ENCOUNTER — LAB SERVICES (OUTPATIENT)
Dept: LAB | Age: 78
End: 2023-02-28

## 2023-02-28 DIAGNOSIS — E11.21 TYPE 2 DIABETES MELLITUS WITH DIABETIC NEPHROPATHY (CMD): ICD-10-CM

## 2023-02-28 DIAGNOSIS — I10 ESSENTIAL (PRIMARY) HYPERTENSION: ICD-10-CM

## 2023-02-28 DIAGNOSIS — R53.82 CHRONIC FATIGUE, UNSPECIFIED: ICD-10-CM

## 2023-02-28 DIAGNOSIS — N18.9 CHRONIC KIDNEY DISEASE, UNSPECIFIED: ICD-10-CM

## 2023-02-28 LAB
ALBUMIN SERPL-MCNC: 3.7 G/DL (ref 3.6–5.1)
ALBUMIN/GLOB SERPL: 1.2 {RATIO} (ref 1–2.4)
ALP SERPL-CCNC: 106 UNITS/L (ref 45–117)
ALT SERPL-CCNC: 22 UNITS/L
ANION GAP SERPL CALC-SCNC: 9 MMOL/L (ref 7–19)
APPEARANCE UR: CLEAR
AST SERPL-CCNC: 24 UNITS/L
BASOPHILS # BLD: 0.1 K/MCL (ref 0–0.3)
BASOPHILS NFR BLD: 1 %
BILIRUB SERPL-MCNC: 0.8 MG/DL (ref 0.2–1)
BILIRUB UR QL STRIP: NEGATIVE
BUN SERPL-MCNC: 16 MG/DL (ref 6–20)
BUN/CREAT SERPL: 15 (ref 7–25)
CALCIUM SERPL-MCNC: 9.4 MG/DL (ref 8.4–10.2)
CHLORIDE SERPL-SCNC: 107 MMOL/L (ref 97–110)
CHOLEST SERPL-MCNC: 147 MG/DL
CHOLEST/HDLC SERPL: 2 {RATIO}
CO2 SERPL-SCNC: 25 MMOL/L (ref 21–32)
COLOR UR: YELLOW
CREAT SERPL-MCNC: 1.1 MG/DL (ref 0.51–0.95)
DEPRECATED RDW RBC: 47.6 FL (ref 39–50)
EOSINOPHIL # BLD: 0.6 K/MCL (ref 0–0.5)
EOSINOPHIL NFR BLD: 7 %
ERYTHROCYTE [DISTWIDTH] IN BLOOD: 13.1 % (ref 11–15)
FASTING DURATION TIME PATIENT: ABNORMAL H
GFR SERPLBLD BASED ON 1.73 SQ M-ARVRAT: 52 ML/MIN
GLOBULIN SER-MCNC: 3 G/DL (ref 2–4)
GLUCOSE SERPL-MCNC: 111 MG/DL (ref 70–99)
GLUCOSE UR STRIP-MCNC: NEGATIVE MG/DL
HBA1C MFR BLD: 5.6 % (ref 4.5–5.6)
HCT VFR BLD CALC: 42.4 % (ref 36–46.5)
HDLC SERPL-MCNC: 74 MG/DL
HGB BLD-MCNC: 14 G/DL (ref 12–15.5)
HGB UR QL STRIP: NEGATIVE
IMM GRANULOCYTES # BLD AUTO: 0 K/MCL (ref 0–0.2)
IMM GRANULOCYTES # BLD: 0 %
KETONES UR STRIP-MCNC: NEGATIVE MG/DL
LDLC SERPL CALC-MCNC: 55 MG/DL
LEUKOCYTE ESTERASE UR QL STRIP: NEGATIVE
LYMPHOCYTES # BLD: 3.3 K/MCL (ref 1–4)
LYMPHOCYTES NFR BLD: 37 %
MCH RBC QN AUTO: 32.6 PG (ref 26–34)
MCHC RBC AUTO-ENTMCNC: 33 G/DL (ref 32–36.5)
MCV RBC AUTO: 98.6 FL (ref 78–100)
MONOCYTES # BLD: 0.7 K/MCL (ref 0.3–0.9)
MONOCYTES NFR BLD: 8 %
NEUTROPHILS # BLD: 4.2 K/MCL (ref 1.8–7.7)
NEUTROPHILS NFR BLD: 47 %
NITRITE UR QL STRIP: NEGATIVE
NONHDLC SERPL-MCNC: 73 MG/DL
NRBC BLD MANUAL-RTO: 0 /100 WBC
PH UR STRIP: 5 [PH] (ref 5–7)
PLATELET # BLD AUTO: 184 K/MCL (ref 140–450)
POTASSIUM SERPL-SCNC: 4.1 MMOL/L (ref 3.4–5.1)
PROT SERPL-MCNC: 6.7 G/DL (ref 6.4–8.2)
PROT UR STRIP-MCNC: NEGATIVE MG/DL
RBC # BLD: 4.3 MIL/MCL (ref 4–5.2)
SODIUM SERPL-SCNC: 137 MMOL/L (ref 135–145)
SP GR UR STRIP: 1.02 (ref 1–1.03)
T4 FREE SERPL-MCNC: 1.3 NG/DL (ref 0.8–1.5)
TRIGL SERPL-MCNC: 89 MG/DL
TSH SERPL-ACNC: 3.35 MCUNITS/ML (ref 0.35–5)
UROBILINOGEN UR STRIP-MCNC: 0.2 MG/DL
VIT B12 SERPL-MCNC: 885 PG/ML (ref 211–911)
WBC # BLD: 8.8 K/MCL (ref 4.2–11)

## 2023-02-28 PROCEDURE — 81003 URINALYSIS AUTO W/O SCOPE: CPT | Performed by: CLINICAL MEDICAL LABORATORY

## 2023-02-28 PROCEDURE — 83036 HEMOGLOBIN GLYCOSYLATED A1C: CPT | Performed by: CLINICAL MEDICAL LABORATORY

## 2023-02-28 PROCEDURE — 85025 COMPLETE CBC W/AUTO DIFF WBC: CPT | Performed by: CLINICAL MEDICAL LABORATORY

## 2023-02-28 PROCEDURE — 80053 COMPREHEN METABOLIC PANEL: CPT | Performed by: CLINICAL MEDICAL LABORATORY

## 2023-02-28 PROCEDURE — 84439 ASSAY OF FREE THYROXINE: CPT | Performed by: CLINICAL MEDICAL LABORATORY

## 2023-02-28 PROCEDURE — 82607 VITAMIN B-12: CPT | Performed by: CLINICAL MEDICAL LABORATORY

## 2023-02-28 PROCEDURE — 80061 LIPID PANEL: CPT | Performed by: CLINICAL MEDICAL LABORATORY

## 2023-02-28 PROCEDURE — 84443 ASSAY THYROID STIM HORMONE: CPT | Performed by: CLINICAL MEDICAL LABORATORY

## 2024-01-04 ENCOUNTER — HOSPITAL ENCOUNTER (OUTPATIENT)
Dept: MAMMOGRAPHY | Age: 79
Discharge: HOME | End: 2024-01-04
Attending: INTERNAL MEDICINE

## 2024-01-04 DIAGNOSIS — Z12.31 ENCOUNTER FOR SCREENING MAMMOGRAM FOR BREAST CANCER: ICD-10-CM

## 2024-01-04 PROCEDURE — 77063 BREAST TOMOSYNTHESIS BI: CPT

## 2024-01-10 ENCOUNTER — LAB SERVICES (OUTPATIENT)
Dept: LAB | Age: 79
End: 2024-01-10

## 2024-01-10 ENCOUNTER — LAB REQUISITION (OUTPATIENT)
Dept: LAB | Age: 79
End: 2024-01-10

## 2024-01-10 DIAGNOSIS — R53.83 OTHER FATIGUE: ICD-10-CM

## 2024-01-10 DIAGNOSIS — I10 ESSENTIAL (PRIMARY) HYPERTENSION: ICD-10-CM

## 2024-01-10 LAB
ALBUMIN SERPL-MCNC: 3.5 G/DL (ref 3.6–5.1)
ALBUMIN/GLOB SERPL: 1 {RATIO} (ref 1–2.4)
ALP SERPL-CCNC: 123 UNITS/L (ref 45–117)
ALT SERPL-CCNC: 22 UNITS/L
ANION GAP SERPL CALC-SCNC: 10 MMOL/L (ref 7–19)
AST SERPL-CCNC: 26 UNITS/L
BASOPHILS # BLD: 0.1 K/MCL (ref 0–0.3)
BASOPHILS NFR BLD: 1 %
BILIRUB SERPL-MCNC: 0.9 MG/DL (ref 0.2–1)
BUN SERPL-MCNC: 20 MG/DL (ref 6–20)
BUN/CREAT SERPL: 16 (ref 7–25)
CALCIUM SERPL-MCNC: 9.6 MG/DL (ref 8.4–10.2)
CHLORIDE SERPL-SCNC: 109 MMOL/L (ref 97–110)
CHOLEST SERPL-MCNC: 151 MG/DL
CHOLEST/HDLC SERPL: 2.7 {RATIO}
CO2 SERPL-SCNC: 24 MMOL/L (ref 21–32)
CREAT SERPL-MCNC: 1.23 MG/DL (ref 0.51–0.95)
DEPRECATED RDW RBC: 44 FL (ref 39–50)
EGFRCR SERPLBLD CKD-EPI 2021: 46 ML/MIN/{1.73_M2}
EOSINOPHIL # BLD: 0.6 K/MCL (ref 0–0.5)
EOSINOPHIL NFR BLD: 7 %
ERYTHROCYTE [DISTWIDTH] IN BLOOD: 12.3 % (ref 11–15)
FASTING DURATION TIME PATIENT: ABNORMAL H
GLOBULIN SER-MCNC: 3.4 G/DL (ref 2–4)
GLUCOSE SERPL-MCNC: 114 MG/DL (ref 70–99)
HBA1C MFR BLD: 5.7 % (ref 4.5–5.6)
HCT VFR BLD CALC: 43.9 % (ref 36–46.5)
HDLC SERPL-MCNC: 56 MG/DL
HGB BLD-MCNC: 14.9 G/DL (ref 12–15.5)
IMM GRANULOCYTES # BLD AUTO: 0 K/MCL (ref 0–0.2)
IMM GRANULOCYTES # BLD: 0 %
LDLC SERPL CALC-MCNC: 66 MG/DL
LYMPHOCYTES # BLD: 3.4 K/MCL (ref 1–4)
LYMPHOCYTES NFR BLD: 37 %
MCH RBC QN AUTO: 33 PG (ref 26–34)
MCHC RBC AUTO-ENTMCNC: 33.9 G/DL (ref 32–36.5)
MCV RBC AUTO: 97.1 FL (ref 78–100)
MONOCYTES # BLD: 0.9 K/MCL (ref 0.3–0.9)
MONOCYTES NFR BLD: 10 %
NEUTROPHILS # BLD: 4.2 K/MCL (ref 1.8–7.7)
NEUTROPHILS NFR BLD: 45 %
NONHDLC SERPL-MCNC: 95 MG/DL
NRBC BLD MANUAL-RTO: 0 /100 WBC
PLATELET # BLD AUTO: 161 K/MCL (ref 140–450)
POTASSIUM SERPL-SCNC: 4.7 MMOL/L (ref 3.4–5.1)
PROT SERPL-MCNC: 6.9 G/DL (ref 6.4–8.2)
RAINBOW EXTRA TUBES HOLD SPECIMEN: NORMAL
RBC # BLD: 4.52 MIL/MCL (ref 4–5.2)
SODIUM SERPL-SCNC: 138 MMOL/L (ref 135–145)
T4 FREE SERPL-MCNC: 1.1 NG/DL (ref 0.8–1.5)
TRIGL SERPL-MCNC: 146 MG/DL
TSH SERPL-ACNC: 2.88 MCUNITS/ML (ref 0.35–5)
WBC # BLD: 9.2 K/MCL (ref 4.2–11)

## 2024-01-10 PROCEDURE — 80053 COMPREHEN METABOLIC PANEL: CPT | Performed by: CLINICAL MEDICAL LABORATORY

## 2024-01-10 PROCEDURE — 80061 LIPID PANEL: CPT | Performed by: CLINICAL MEDICAL LABORATORY

## 2024-01-10 PROCEDURE — 85025 COMPLETE CBC W/AUTO DIFF WBC: CPT | Performed by: CLINICAL MEDICAL LABORATORY

## 2024-01-10 PROCEDURE — 84439 ASSAY OF FREE THYROXINE: CPT | Performed by: CLINICAL MEDICAL LABORATORY

## 2024-01-10 PROCEDURE — 36415 COLL VENOUS BLD VENIPUNCTURE: CPT | Performed by: CLINICAL MEDICAL LABORATORY

## 2024-01-10 PROCEDURE — 83036 HEMOGLOBIN GLYCOSYLATED A1C: CPT | Performed by: CLINICAL MEDICAL LABORATORY

## 2024-01-10 PROCEDURE — 84443 ASSAY THYROID STIM HORMONE: CPT | Performed by: CLINICAL MEDICAL LABORATORY

## 2024-01-24 ENCOUNTER — LAB REQUISITION (OUTPATIENT)
Dept: LAB | Age: 79
End: 2024-01-24

## 2024-01-24 DIAGNOSIS — E78.5 HYPERLIPIDEMIA, UNSPECIFIED: ICD-10-CM

## 2024-01-24 DIAGNOSIS — N18.9 CHRONIC KIDNEY DISEASE, UNSPECIFIED: ICD-10-CM

## 2024-01-24 DIAGNOSIS — L29.9 PRURITUS, UNSPECIFIED: ICD-10-CM

## 2024-01-24 DIAGNOSIS — R73.03 PREDIABETES: ICD-10-CM

## 2024-01-24 DIAGNOSIS — I10 ESSENTIAL (PRIMARY) HYPERTENSION: ICD-10-CM

## 2024-03-13 ENCOUNTER — OV EP (OUTPATIENT)
Dept: URBAN - METROPOLITAN AREA CLINIC 33 | Facility: CLINIC | Age: 79
End: 2024-03-13

## 2024-07-29 ENCOUNTER — APPOINTMENT (OUTPATIENT)
Dept: LAB | Age: 79
End: 2024-07-29

## 2024-07-29 LAB
ALBUMIN SERPL-MCNC: 3.3 G/DL (ref 3.6–5.1)
ALBUMIN/GLOB SERPL: 0.9 {RATIO} (ref 1–2.4)
ALP SERPL-CCNC: 149 UNITS/L (ref 45–117)
ALT SERPL-CCNC: 25 UNITS/L
ANION GAP SERPL CALC-SCNC: 10 MMOL/L (ref 7–19)
AST SERPL-CCNC: 24 UNITS/L
BASOPHILS # BLD: 0 K/MCL (ref 0–0.3)
BASOPHILS NFR BLD: 1 %
BILIRUB SERPL-MCNC: 0.7 MG/DL (ref 0.2–1)
BUN SERPL-MCNC: 18 MG/DL (ref 6–20)
BUN/CREAT SERPL: 16 (ref 7–25)
CALCIUM SERPL-MCNC: 9.2 MG/DL (ref 8.4–10.2)
CHLORIDE SERPL-SCNC: 109 MMOL/L (ref 97–110)
CHOLEST SERPL-MCNC: 158 MG/DL
CHOLEST/HDLC SERPL: 2.9 {RATIO}
CO2 SERPL-SCNC: 24 MMOL/L (ref 21–32)
CREAT SERPL-MCNC: 1.15 MG/DL (ref 0.51–0.95)
DEPRECATED RDW RBC: 46.5 FL (ref 39–50)
EGFRCR SERPLBLD CKD-EPI 2021: 49 ML/MIN/{1.73_M2}
EOSINOPHIL # BLD: 0.6 K/MCL (ref 0–0.5)
EOSINOPHIL NFR BLD: 7 %
ERYTHROCYTE [DISTWIDTH] IN BLOOD: 12.4 % (ref 11–15)
FASTING DURATION TIME PATIENT: 12 HOURS (ref 0–999)
GLOBULIN SER-MCNC: 3.6 G/DL (ref 2–4)
GLUCOSE SERPL-MCNC: 99 MG/DL (ref 70–99)
HBA1C MFR BLD: 5.7 % (ref 4.5–5.6)
HCT VFR BLD CALC: 42 % (ref 36–46.5)
HDLC SERPL-MCNC: 54 MG/DL
HGB BLD-MCNC: 13.8 G/DL (ref 12–15.5)
IMM GRANULOCYTES # BLD AUTO: 0 K/MCL (ref 0–0.2)
IMM GRANULOCYTES # BLD: 0 %
LDLC SERPL CALC-MCNC: 79 MG/DL
LYMPHOCYTES # BLD: 3.1 K/MCL (ref 1–4)
LYMPHOCYTES NFR BLD: 38 %
MCH RBC QN AUTO: 33.1 PG (ref 26–34)
MCHC RBC AUTO-ENTMCNC: 32.9 G/DL (ref 32–36.5)
MCV RBC AUTO: 100.7 FL (ref 78–100)
MONOCYTES # BLD: 0.7 K/MCL (ref 0.3–0.9)
MONOCYTES NFR BLD: 9 %
NEUTROPHILS # BLD: 3.6 K/MCL (ref 1.8–7.7)
NEUTROPHILS NFR BLD: 45 %
NONHDLC SERPL-MCNC: 104 MG/DL
NRBC BLD MANUAL-RTO: 0 /100 WBC
PLATELET # BLD AUTO: 158 K/MCL (ref 140–450)
POTASSIUM SERPL-SCNC: 4.5 MMOL/L (ref 3.4–5.1)
PROT SERPL-MCNC: 6.9 G/DL (ref 6.4–8.2)
RAINBOW EXTRA TUBES HOLD SPECIMEN: NORMAL
RBC # BLD: 4.17 MIL/MCL (ref 4–5.2)
SODIUM SERPL-SCNC: 138 MMOL/L (ref 135–145)
T4 FREE SERPL-MCNC: 1 NG/DL (ref 0.8–1.5)
TRIGL SERPL-MCNC: 125 MG/DL
TSH SERPL-ACNC: 4.64 MCUNITS/ML (ref 0.35–5)
WBC # BLD: 8 K/MCL (ref 4.2–11)

## 2024-07-29 PROCEDURE — 85025 COMPLETE CBC W/AUTO DIFF WBC: CPT | Performed by: CLINICAL MEDICAL LABORATORY

## 2024-07-29 PROCEDURE — 36415 COLL VENOUS BLD VENIPUNCTURE: CPT | Performed by: CLINICAL MEDICAL LABORATORY

## 2024-07-29 PROCEDURE — 83036 HEMOGLOBIN GLYCOSYLATED A1C: CPT | Performed by: CLINICAL MEDICAL LABORATORY

## 2024-07-29 PROCEDURE — 80061 LIPID PANEL: CPT | Performed by: CLINICAL MEDICAL LABORATORY

## 2024-07-29 PROCEDURE — 84443 ASSAY THYROID STIM HORMONE: CPT | Performed by: CLINICAL MEDICAL LABORATORY

## 2024-07-29 PROCEDURE — 80053 COMPREHEN METABOLIC PANEL: CPT | Performed by: CLINICAL MEDICAL LABORATORY

## 2024-07-29 PROCEDURE — 84439 ASSAY OF FREE THYROXINE: CPT | Performed by: CLINICAL MEDICAL LABORATORY

## 2024-08-05 ENCOUNTER — LAB REQUISITION (OUTPATIENT)
Dept: LAB | Age: 79
End: 2024-08-05

## 2024-08-05 DIAGNOSIS — R74.8 ABNORMAL LEVELS OF OTHER SERUM ENZYMES: ICD-10-CM

## 2024-08-05 PROCEDURE — 84075 ASSAY ALKALINE PHOSPHATASE: CPT | Performed by: CLINICAL MEDICAL LABORATORY

## 2024-08-05 PROCEDURE — 84080 ASSAY ALKALINE PHOSPHATASES: CPT | Performed by: CLINICAL MEDICAL LABORATORY

## 2024-08-07 LAB
ALP BONE SERPL-CCNC: 47 U/L (ref 0–55)
ALP LIVER SERPL-CCNC: 127 U/L (ref 0–94)
ALP OTHER SERPL-CCNC: 0 U/L
ALP SERPL-CCNC: 174 U/L (ref 40–120)

## 2024-08-09 DIAGNOSIS — R79.89 ELEVATED LIVER FUNCTION TESTS: Primary | ICD-10-CM

## 2024-09-11 ENCOUNTER — HOSPITAL ENCOUNTER (OUTPATIENT)
Dept: ULTRASOUND IMAGING | Age: 79
Discharge: HOME | End: 2024-09-11
Attending: INTERNAL MEDICINE

## 2024-09-11 DIAGNOSIS — R79.89 ELEVATED LIVER FUNCTION TESTS: ICD-10-CM

## 2024-09-11 PROCEDURE — 76705 ECHO EXAM OF ABDOMEN: CPT

## 2024-09-13 DIAGNOSIS — R93.2 ABNORMAL GALLBLADDER ULTRASOUND: Primary | ICD-10-CM

## 2024-10-02 ENCOUNTER — APPOINTMENT (OUTPATIENT)
Dept: MRI IMAGING | Age: 79
End: 2024-10-02
Attending: INTERNAL MEDICINE

## 2025-02-07 DIAGNOSIS — Z12.31 ENCOUNTER FOR SCREENING MAMMOGRAM FOR MALIGNANT NEOPLASM OF BREAST: Primary | ICD-10-CM

## 2025-02-11 ENCOUNTER — HOSPITAL ENCOUNTER (OUTPATIENT)
Dept: MAMMOGRAPHY | Age: 80
Discharge: HOME | End: 2025-02-11
Attending: INTERNAL MEDICINE

## 2025-02-11 DIAGNOSIS — Z12.31 ENCOUNTER FOR SCREENING MAMMOGRAM FOR MALIGNANT NEOPLASM OF BREAST: ICD-10-CM

## 2025-02-11 PROCEDURE — 77067 SCR MAMMO BI INCL CAD: CPT

## 2025-02-24 ENCOUNTER — APPOINTMENT (OUTPATIENT)
Dept: LAB | Age: 80
End: 2025-02-24

## 2025-02-24 ENCOUNTER — LAB REQUISITION (OUTPATIENT)
Dept: LAB | Age: 80
End: 2025-02-24

## 2025-02-24 DIAGNOSIS — I10 ESSENTIAL (PRIMARY) HYPERTENSION: ICD-10-CM

## 2025-02-24 DIAGNOSIS — E53.9 VITAMIN B DEFICIENCY, UNSPECIFIED: ICD-10-CM

## 2025-02-24 DIAGNOSIS — E11.9 TYPE 2 DIABETES MELLITUS WITHOUT COMPLICATIONS  (CMD): ICD-10-CM

## 2025-02-24 DIAGNOSIS — Z00.00 ENCOUNTER FOR GENERAL ADULT MEDICAL EXAMINATION WITHOUT ABNORMAL FINDINGS: ICD-10-CM

## 2025-02-24 DIAGNOSIS — E55.9 VITAMIN D DEFICIENCY, UNSPECIFIED: ICD-10-CM

## 2025-02-24 LAB
25(OH)D3+25(OH)D2 SERPL-MCNC: 28 NG/ML (ref 30–100)
ALBUMIN SERPL-MCNC: 3.4 G/DL (ref 3.4–5)
ALBUMIN/GLOB SERPL: 1.1 {RATIO} (ref 1–2.4)
ALP SERPL-CCNC: 168 UNITS/L (ref 45–117)
ALT SERPL-CCNC: 15 UNITS/L
ANION GAP SERPL CALC-SCNC: 10 MMOL/L (ref 7–19)
AST SERPL-CCNC: 16 UNITS/L
BASOPHILS # BLD: 0 K/MCL (ref 0–0.3)
BASOPHILS NFR BLD: 1 %
BILIRUB SERPL-MCNC: 0.7 MG/DL (ref 0.2–1)
BUN SERPL-MCNC: 10 MG/DL (ref 6–20)
BUN/CREAT SERPL: 8 (ref 7–25)
CALCIUM SERPL-MCNC: 8.9 MG/DL (ref 8.4–10.2)
CHLORIDE SERPL-SCNC: 108 MMOL/L (ref 97–110)
CHOLEST SERPL-MCNC: 120 MG/DL
CHOLEST/HDLC SERPL: 2.1 {RATIO}
CO2 SERPL-SCNC: 26 MMOL/L (ref 21–32)
CREAT SERPL-MCNC: 1.26 MG/DL (ref 0.51–0.95)
DEPRECATED RDW RBC: 45.8 FL (ref 39–50)
EGFRCR SERPLBLD CKD-EPI 2021: 44 ML/MIN/{1.73_M2}
EOSINOPHIL # BLD: 0.9 K/MCL (ref 0–0.5)
EOSINOPHIL NFR BLD: 12 %
ERYTHROCYTE [DISTWIDTH] IN BLOOD: 12.7 % (ref 11–15)
FASTING DURATION TIME PATIENT: ABNORMAL H
GLOBULIN SER-MCNC: 3.2 G/DL (ref 2–4)
GLUCOSE SERPL-MCNC: 100 MG/DL (ref 70–99)
HBA1C MFR BLD: 5.8 % (ref 4.5–5.6)
HCT VFR BLD CALC: 40 % (ref 36–46.5)
HDLC SERPL-MCNC: 58 MG/DL
HGB BLD-MCNC: 13.4 G/DL (ref 12–15.5)
IMM GRANULOCYTES # BLD AUTO: 0 K/MCL (ref 0–0.2)
IMM GRANULOCYTES # BLD: 0 %
LDLC SERPL CALC-MCNC: 39 MG/DL
LYMPHOCYTES # BLD: 2.8 K/MCL (ref 1–4)
LYMPHOCYTES NFR BLD: 38 %
MCH RBC QN AUTO: 32.8 PG (ref 26–34)
MCHC RBC AUTO-ENTMCNC: 33.5 G/DL (ref 32–36.5)
MCV RBC AUTO: 98 FL (ref 78–100)
MONOCYTES # BLD: 0.6 K/MCL (ref 0.3–0.9)
MONOCYTES NFR BLD: 9 %
NEUTROPHILS # BLD: 3 K/MCL (ref 1.8–7.7)
NEUTROPHILS NFR BLD: 40 %
NONHDLC SERPL-MCNC: 62 MG/DL
NRBC BLD MANUAL-RTO: 0 /100 WBC
PLATELET # BLD AUTO: 159 K/MCL (ref 140–450)
POTASSIUM SERPL-SCNC: 4.2 MMOL/L (ref 3.4–5.1)
PROT SERPL-MCNC: 6.6 G/DL (ref 6.4–8.2)
RBC # BLD: 4.08 MIL/MCL (ref 4–5.2)
SODIUM SERPL-SCNC: 140 MMOL/L (ref 135–145)
T4 FREE SERPL-MCNC: 1.3 NG/DL (ref 0.8–1.5)
TRIGL SERPL-MCNC: 116 MG/DL
TSH SERPL-ACNC: 3.51 MCUNITS/ML (ref 0.35–5)
VIT B12 SERPL-MCNC: 319 PG/ML (ref 211–911)
WBC # BLD: 7.4 K/MCL (ref 4.2–11)

## 2025-02-24 PROCEDURE — 82607 VITAMIN B-12: CPT | Performed by: CLINICAL MEDICAL LABORATORY

## 2025-02-24 PROCEDURE — 83036 HEMOGLOBIN GLYCOSYLATED A1C: CPT | Performed by: CLINICAL MEDICAL LABORATORY

## 2025-02-24 PROCEDURE — 82306 VITAMIN D 25 HYDROXY: CPT | Performed by: CLINICAL MEDICAL LABORATORY

## 2025-02-24 PROCEDURE — 85025 COMPLETE CBC W/AUTO DIFF WBC: CPT | Performed by: CLINICAL MEDICAL LABORATORY

## 2025-02-24 PROCEDURE — 84439 ASSAY OF FREE THYROXINE: CPT | Performed by: CLINICAL MEDICAL LABORATORY

## 2025-02-24 PROCEDURE — 80061 LIPID PANEL: CPT | Performed by: CLINICAL MEDICAL LABORATORY

## 2025-02-24 PROCEDURE — 84443 ASSAY THYROID STIM HORMONE: CPT | Performed by: CLINICAL MEDICAL LABORATORY

## 2025-02-24 PROCEDURE — 80053 COMPREHEN METABOLIC PANEL: CPT | Performed by: CLINICAL MEDICAL LABORATORY

## 2025-02-24 PROCEDURE — 36415 COLL VENOUS BLD VENIPUNCTURE: CPT | Performed by: CLINICAL MEDICAL LABORATORY

## 2025-03-04 ENCOUNTER — LAB REQUISITION (OUTPATIENT)
Dept: LAB | Age: 80
End: 2025-03-04

## 2025-03-04 DIAGNOSIS — F41.9 ANXIETY DISORDER, UNSPECIFIED: ICD-10-CM

## 2025-03-04 DIAGNOSIS — R73.03 PREDIABETES: ICD-10-CM

## 2025-03-04 DIAGNOSIS — J45.909 UNSPECIFIED ASTHMA, UNCOMPLICATED (CMD): ICD-10-CM

## 2025-03-04 DIAGNOSIS — R74.8 ABNORMAL LEVELS OF OTHER SERUM ENZYMES: ICD-10-CM

## 2025-03-04 DIAGNOSIS — I10 ESSENTIAL (PRIMARY) HYPERTENSION: ICD-10-CM

## 2025-03-04 DIAGNOSIS — N18.9 CHRONIC KIDNEY DISEASE, UNSPECIFIED: ICD-10-CM

## 2025-03-04 DIAGNOSIS — E78.5 HYPERLIPIDEMIA, UNSPECIFIED: ICD-10-CM

## 2025-04-02 ENCOUNTER — P2P PATIENT RECORD (OUTPATIENT)
Age: 80
End: 2025-04-02

## 2025-05-05 ENCOUNTER — OFFICE VISIT (OUTPATIENT)
Dept: URBAN - METROPOLITAN AREA CLINIC 33 | Facility: CLINIC | Age: 80
End: 2025-05-05
Payer: COMMERCIAL

## 2025-05-05 DIAGNOSIS — Z86.0101 H/O ADENOMATOUS POLYP OF COLON: ICD-10-CM

## 2025-05-05 DIAGNOSIS — K31.A0 GASTRIC INTESTINAL METAPLASIA: ICD-10-CM

## 2025-05-05 PROBLEM — 428283002: Status: ACTIVE | Noted: 2025-05-05

## 2025-05-05 PROBLEM — 72519002: Status: ACTIVE | Noted: 2025-05-05

## 2025-05-05 PROCEDURE — 99213 OFFICE O/P EST LOW 20 MIN: CPT | Performed by: INTERNAL MEDICINE

## 2025-05-05 RX ORDER — UBIDECARENONE/VIT E ACET 100MG-5
3 CAPSULES CAPSULE ORAL ONCE A DAY
Status: ACTIVE | COMMUNITY

## 2025-05-05 RX ORDER — B-COMPLEX WITH VITAMIN C
AS DIRECTED TABLET ORAL
Status: ON HOLD | COMMUNITY

## 2025-05-05 RX ORDER — MAGNESIUM OXIDE/MAG AA CHELATE 300 MG
1 CAPSULE WITH A MEAL CAPSULE ORAL ONCE A DAY
Status: ON HOLD | COMMUNITY

## 2025-05-05 RX ORDER — ZOLPIDEM TARTRATE 10 MG/1
1 TABLET AT BEDTIME TABLET, FILM COATED ORAL ONCE A DAY
Status: ACTIVE | COMMUNITY

## 2025-05-05 RX ORDER — AMOXICILLIN 500 MG
AS DIRECTED CAPSULE ORAL
Status: ON HOLD | COMMUNITY

## 2025-05-05 RX ORDER — FAMOTIDINE 20 MG/1
1 TABLET AT BEDTIME AS NEEDED TABLET, FILM COATED ORAL ONCE A DAY
Qty: 90 TABLET | Refills: 1 | Status: ON HOLD | COMMUNITY

## 2025-05-05 RX ORDER — ASPIRIN 81 MG/1
1 TABLET TABLET, CHEWABLE ORAL ONCE A DAY
Status: ACTIVE | COMMUNITY

## 2025-05-05 RX ORDER — B-COMPLEX WITH VITAMIN C
AS DIRECTED TABLET ORAL
Status: ACTIVE | COMMUNITY

## 2025-05-05 RX ORDER — OMEPRAZOLE 40 MG/1
1 CAPSULE 30 MINUTES BEFORE MORNING MEAL CAPSULE, DELAYED RELEASE ORAL ONCE A DAY
Status: ON HOLD | COMMUNITY

## 2025-05-05 RX ORDER — FLUCONAZOLE 100 MG/1
1 TABLET TABLET ORAL DAILY
Qty: 11 | Status: ON HOLD | COMMUNITY
Start: 2021-04-23

## 2025-05-05 NOTE — HPI-PERSONAL HISTORY OF COLON POLYPS
Patient presents today for repeat colonoscopy consult. Patient states last colonoscopy was 04.16.2021with findings of Transverse polyp- Sessile serrated adenoma. Sigmoid polyp-Hyperplastic, Diverticulosis, Internal hemorrhoids, Anal papilla(e) were hypertrophied. Patient admits a personal history of colon polyps. Patient was advised to repeat colonoscopy in 5 years. Patient denies a family history of colon polyps or colon cancer.  Patient currently admits normal bowel habits.   Patient denies any current symptoms of rectal bleeding, melena or mucus.

## 2025-05-05 NOTE — HPI-EPIGASTRIC PAIN
Patient is established, who presents today for a follow-up of Epigastric Pain past history. She denies past associated symptoms of globus, satiety, gas, and change of bowel habits since her last visit 01.19.2023. Patient she is in good health and has good energy.  History of Gastric intestinal metaplasia   Most recent labs completed on 04.23.2025, PCP Rosendo Holm.    Lipid Panel Cholesterol 219H, LDL-Cholesterol 120H, NonHDL Cholesterol 138H,  Hemoglobin A1c 5.7H,  CMP All within normal limits Uric acid and TSH within normal, CBC all within normal limits.  (Last visit 01.19.20123)  Patient presents today for a follow-up of Epigastric Pain. She denies continued pain since her last visit. She reports occasionally post prandial she had an episode due to eating to fast.   She admits completing the course of Augmentin 875-125 mg 1 BID x 10 days for SIBO.   Last visit (11/21/2022)  Patient present today for 6 month follow-up. She admits any improvement since last visit. She continues the use of Famotidine 40mg with relief of symptoms. Symptoms will last for moments.

## 2025-06-13 ENCOUNTER — OFFICE VISIT (OUTPATIENT)
Dept: URBAN - METROPOLITAN AREA SURGERY CENTER 8 | Facility: SURGERY CENTER | Age: 80
End: 2025-06-13

## 2025-06-19 ENCOUNTER — APPOINTMENT (OUTPATIENT)
Dept: ORTHOPEDICS | Age: 80
End: 2025-06-19

## 2025-07-02 ENCOUNTER — P2P PATIENT RECORD (OUTPATIENT)
Age: 80
End: 2025-07-02

## 2025-07-03 ENCOUNTER — OFFICE VISIT (OUTPATIENT)
Dept: URBAN - METROPOLITAN AREA CLINIC 33 | Facility: CLINIC | Age: 80
End: 2025-07-03

## 2025-08-28 ENCOUNTER — OFFICE VISIT (OUTPATIENT)
Dept: URBAN - METROPOLITAN AREA CLINIC 78 | Facility: CLINIC | Age: 80
End: 2025-08-28